# Patient Record
Sex: FEMALE | Race: BLACK OR AFRICAN AMERICAN | NOT HISPANIC OR LATINO | Employment: FULL TIME | URBAN - METROPOLITAN AREA
[De-identification: names, ages, dates, MRNs, and addresses within clinical notes are randomized per-mention and may not be internally consistent; named-entity substitution may affect disease eponyms.]

---

## 2017-04-18 ENCOUNTER — HISTORICAL (OUTPATIENT)
Dept: LAB | Facility: HOSPITAL | Age: 52
End: 2017-04-18

## 2018-02-06 ENCOUNTER — HISTORICAL (OUTPATIENT)
Dept: LAB | Facility: HOSPITAL | Age: 53
End: 2018-02-06

## 2018-02-06 LAB
ABS NEUT (OLG): 2.64 X10(3)/MCL (ref 2.1–9.2)
ALBUMIN SERPL-MCNC: 3.5 GM/DL (ref 3.4–5)
ALBUMIN/GLOB SERPL: 0.6 {RATIO}
ALP SERPL-CCNC: 95 UNIT/L (ref 38–126)
ALT SERPL-CCNC: 41 UNIT/L (ref 12–78)
APPEARANCE, UA: CLEAR
AST SERPL-CCNC: 21 UNIT/L (ref 15–37)
BACTERIA SPEC CULT: ABNORMAL /HPF
BASOPHILS # BLD AUTO: 0 X10(3)/MCL (ref 0–0.2)
BASOPHILS NFR BLD AUTO: 1 %
BILIRUB SERPL-MCNC: 0.2 MG/DL (ref 0.2–1)
BILIRUB UR QL STRIP: NEGATIVE
BILIRUBIN DIRECT+TOT PNL SERPL-MCNC: 0.1 MG/DL (ref 0–0.2)
BILIRUBIN DIRECT+TOT PNL SERPL-MCNC: 0.1 MG/DL (ref 0–0.8)
BUN SERPL-MCNC: 13 MG/DL (ref 7–18)
CALCIUM SERPL-MCNC: 9.1 MG/DL (ref 8.5–10.1)
CHLORIDE SERPL-SCNC: 102 MMOL/L (ref 98–107)
CHOLEST SERPL-MCNC: 180 MG/DL (ref 0–200)
CHOLEST/HDLC SERPL: 2.6 {RATIO} (ref 0–4)
CO2 SERPL-SCNC: 32 MMOL/L (ref 21–32)
COLOR UR: YELLOW
CREAT SERPL-MCNC: 0.69 MG/DL (ref 0.55–1.02)
EOSINOPHIL # BLD AUTO: 0.2 X10(3)/MCL (ref 0–0.9)
EOSINOPHIL NFR BLD AUTO: 4 %
ERYTHROCYTE [DISTWIDTH] IN BLOOD BY AUTOMATED COUNT: 13.8 % (ref 11.5–17)
GLOBULIN SER-MCNC: 5.4 GM/DL (ref 2.4–3.5)
GLUCOSE (UA): NEGATIVE
GLUCOSE SERPL-MCNC: 132 MG/DL (ref 74–106)
HCT VFR BLD AUTO: 40.5 % (ref 37–47)
HDLC SERPL-MCNC: 70 MG/DL (ref 35–60)
HGB BLD-MCNC: 12.2 GM/DL (ref 12–16)
HGB UR QL STRIP: NEGATIVE
IRON SATN MFR SERPL: 20.1 % (ref 20–50)
IRON SERPL-MCNC: 51 MCG/DL (ref 50–175)
KETONES UR QL STRIP: NEGATIVE
LDLC SERPL CALC-MCNC: 94 MG/DL (ref 0–129)
LEUKOCYTE ESTERASE UR QL STRIP: ABNORMAL
LYMPHOCYTES # BLD AUTO: 1.7 X10(3)/MCL (ref 0.6–4.6)
LYMPHOCYTES NFR BLD AUTO: 34 %
MCH RBC QN AUTO: 27.2 PG (ref 27–31)
MCHC RBC AUTO-ENTMCNC: 30.1 GM/DL (ref 33–36)
MCV RBC AUTO: 90.4 FL (ref 80–94)
MONOCYTES # BLD AUTO: 0.3 X10(3)/MCL (ref 0.1–1.3)
MONOCYTES NFR BLD AUTO: 7 %
NEUTROPHILS # BLD AUTO: 2.64 X10(3)/MCL (ref 1.4–7.9)
NEUTROPHILS NFR BLD AUTO: 54 %
NITRITE UR QL STRIP: NEGATIVE
PH UR STRIP: 6.5 [PH] (ref 5–9)
PLATELET # BLD AUTO: 183 X10(3)/MCL (ref 130–400)
PMV BLD AUTO: 10.6 FL (ref 9.4–12.4)
POTASSIUM SERPL-SCNC: 4.1 MMOL/L (ref 3.5–5.1)
PROT SERPL-MCNC: 8.9 GM/DL (ref 6.4–8.2)
PROT UR QL STRIP: NEGATIVE
RBC # BLD AUTO: 4.48 X10(6)/MCL (ref 4.2–5.4)
RBC #/AREA URNS HPF: ABNORMAL /[HPF]
SODIUM SERPL-SCNC: 138 MMOL/L (ref 136–145)
SP GR UR STRIP: 1.02 (ref 1–1.03)
SQUAMOUS EPITHELIAL, UA: ABNORMAL
TIBC SERPL-MCNC: 254 MCG/DL (ref 250–450)
TRANSFERRIN SERPL-MCNC: 208 MG/DL (ref 200–360)
TRIGL SERPL-MCNC: 78 MG/DL (ref 30–150)
TSH SERPL-ACNC: 3.62 MIU/ML (ref 0.36–3.74)
UROBILINOGEN UR STRIP-ACNC: 1
VLDLC SERPL CALC-MCNC: 16 MG/DL
WBC # SPEC AUTO: 4.9 X10(3)/MCL (ref 4.5–11.5)
WBC #/AREA URNS HPF: ABNORMAL /[HPF]

## 2019-07-12 ENCOUNTER — HISTORICAL (OUTPATIENT)
Dept: LAB | Facility: HOSPITAL | Age: 54
End: 2019-07-12

## 2019-07-12 LAB
ABS NEUT (OLG): 1.86 X10(3)/MCL (ref 2.1–9.2)
ALBUMIN SERPL-MCNC: 3.6 GM/DL (ref 3.4–5)
ALBUMIN/GLOB SERPL: 0.8 {RATIO}
ALP SERPL-CCNC: 84 UNIT/L (ref 38–126)
ALT SERPL-CCNC: 22 UNIT/L (ref 12–78)
APPEARANCE, UA: CLEAR
AST SERPL-CCNC: 13 UNIT/L (ref 15–37)
BACTERIA SPEC CULT: NORMAL /HPF
BASOPHILS # BLD AUTO: 0 X10(3)/MCL (ref 0–0.2)
BASOPHILS NFR BLD AUTO: 1 %
BILIRUB SERPL-MCNC: 0.3 MG/DL (ref 0.2–1)
BILIRUB UR QL STRIP: NEGATIVE
BILIRUBIN DIRECT+TOT PNL SERPL-MCNC: 0.1 MG/DL (ref 0–0.2)
BILIRUBIN DIRECT+TOT PNL SERPL-MCNC: 0.2 MG/DL (ref 0–0.8)
BUN SERPL-MCNC: 11 MG/DL (ref 7–18)
CALCIUM SERPL-MCNC: 8.9 MG/DL (ref 8.5–10.1)
CHLORIDE SERPL-SCNC: 103 MMOL/L (ref 98–107)
CHOLEST SERPL-MCNC: 175 MG/DL (ref 0–200)
CHOLEST/HDLC SERPL: 2.9 {RATIO} (ref 0–4)
CO2 SERPL-SCNC: 31 MMOL/L (ref 21–32)
COLOR UR: YELLOW
CREAT SERPL-MCNC: 0.66 MG/DL (ref 0.55–1.02)
EOSINOPHIL # BLD AUTO: 0.3 X10(3)/MCL (ref 0–0.9)
EOSINOPHIL NFR BLD AUTO: 6 %
ERYTHROCYTE [DISTWIDTH] IN BLOOD BY AUTOMATED COUNT: 14 % (ref 11.5–17)
GLOBULIN SER-MCNC: 4.7 GM/DL (ref 2.4–3.5)
GLUCOSE (UA): NEGATIVE
GLUCOSE SERPL-MCNC: 112 MG/DL (ref 74–106)
HCT VFR BLD AUTO: 41.5 % (ref 37–47)
HDLC SERPL-MCNC: 60 MG/DL (ref 35–60)
HGB BLD-MCNC: 12.5 GM/DL (ref 12–16)
HGB UR QL STRIP: NEGATIVE
KETONES UR QL STRIP: NEGATIVE
LDLC SERPL CALC-MCNC: 102 MG/DL (ref 0–129)
LEUKOCYTE ESTERASE UR QL STRIP: NEGATIVE
LYMPHOCYTES # BLD AUTO: 2 X10(3)/MCL (ref 0.6–4.6)
LYMPHOCYTES NFR BLD AUTO: 44 %
MCH RBC QN AUTO: 26.7 PG (ref 27–31)
MCHC RBC AUTO-ENTMCNC: 30.1 GM/DL (ref 33–36)
MCV RBC AUTO: 88.7 FL (ref 80–94)
MONOCYTES # BLD AUTO: 0.4 X10(3)/MCL (ref 0.1–1.3)
MONOCYTES NFR BLD AUTO: 8 %
NEUTROPHILS # BLD AUTO: 1.86 X10(3)/MCL (ref 2.1–9.2)
NEUTROPHILS NFR BLD AUTO: 41 %
NITRITE UR QL STRIP: NEGATIVE
PH UR STRIP: 5.5 [PH] (ref 5–9)
PLATELET # BLD AUTO: 183 X10(3)/MCL (ref 130–400)
PMV BLD AUTO: 11.2 FL (ref 9.4–12.4)
POTASSIUM SERPL-SCNC: 4.1 MMOL/L (ref 3.5–5.1)
PROT SERPL-MCNC: 8.3 GM/DL (ref 6.4–8.2)
PROT UR QL STRIP: NEGATIVE
RBC # BLD AUTO: 4.68 X10(6)/MCL (ref 4.2–5.4)
RBC #/AREA URNS HPF: NORMAL /[HPF]
SODIUM SERPL-SCNC: 138 MMOL/L (ref 136–145)
SP GR UR STRIP: 1.02 (ref 1–1.03)
SQUAMOUS EPITHELIAL, UA: NORMAL
TRIGL SERPL-MCNC: 63 MG/DL (ref 30–150)
TSH SERPL-ACNC: 2.62 MIU/L (ref 0.36–3.74)
UROBILINOGEN UR STRIP-ACNC: 1
VLDLC SERPL CALC-MCNC: 13 MG/DL
WBC # SPEC AUTO: 4.5 X10(3)/MCL (ref 4.5–11.5)
WBC #/AREA URNS HPF: NORMAL /[HPF]

## 2020-02-03 ENCOUNTER — HOSPITAL ENCOUNTER (OUTPATIENT)
Dept: MEDSURG UNIT | Facility: HOSPITAL | Age: 55
End: 2020-02-06
Attending: INTERNAL MEDICINE | Admitting: INTERNAL MEDICINE

## 2020-02-03 LAB
ABS NEUT (OLG): 5.06 X10(3)/MCL (ref 2.1–9.2)
ALBUMIN SERPL-MCNC: 3.3 GM/DL (ref 3.4–5)
ALBUMIN/GLOB SERPL: 0.6 {RATIO}
ALP SERPL-CCNC: 74 UNIT/L (ref 38–126)
ALT SERPL-CCNC: 15 UNIT/L (ref 12–78)
APTT PPP: 21.3 SECOND(S) (ref 24.2–33.9)
AST SERPL-CCNC: 9 UNIT/L (ref 15–37)
BASOPHILS # BLD AUTO: 0 X10(3)/MCL (ref 0–0.2)
BASOPHILS NFR BLD AUTO: 0 %
BILIRUB SERPL-MCNC: 0.5 MG/DL (ref 0.2–1)
BILIRUBIN DIRECT+TOT PNL SERPL-MCNC: 0.1 MG/DL (ref 0–0.2)
BILIRUBIN DIRECT+TOT PNL SERPL-MCNC: 0.4 MG/DL (ref 0–0.8)
BUN SERPL-MCNC: 18 MG/DL (ref 7–18)
CALCIUM SERPL-MCNC: 8.6 MG/DL (ref 8.5–10.1)
CHLORIDE SERPL-SCNC: 102 MMOL/L (ref 98–107)
CO2 SERPL-SCNC: 30 MMOL/L (ref 21–32)
CREAT SERPL-MCNC: 0.71 MG/DL (ref 0.55–1.02)
EOSINOPHIL # BLD AUTO: 0 X10(3)/MCL (ref 0–0.9)
EOSINOPHIL NFR BLD AUTO: 0 %
ERYTHROCYTE [DISTWIDTH] IN BLOOD BY AUTOMATED COUNT: 19.5 % (ref 11.5–17)
GLOBULIN SER-MCNC: 5.2 GM/DL (ref 2.4–3.5)
GLUCOSE SERPL-MCNC: 175 MG/DL (ref 74–106)
HCT VFR BLD AUTO: 24.9 % (ref 37–47)
HGB BLD-MCNC: 7.2 GM/DL (ref 12–16)
INR PPP: 1 (ref 0–1.3)
LYMPHOCYTES # BLD AUTO: 3.2 X10(3)/MCL (ref 0.6–4.6)
LYMPHOCYTES NFR BLD AUTO: 35 %
MCH RBC QN AUTO: 22.6 PG (ref 27–31)
MCHC RBC AUTO-ENTMCNC: 28.9 GM/DL (ref 33–36)
MCV RBC AUTO: 78.3 FL (ref 80–94)
MONOCYTES # BLD AUTO: 0.7 X10(3)/MCL (ref 0.1–1.3)
MONOCYTES NFR BLD AUTO: 8 %
NEUTROPHILS # BLD AUTO: 5.06 X10(3)/MCL (ref 2.1–9.2)
NEUTROPHILS NFR BLD AUTO: 56 %
PLATELET # BLD AUTO: 361 X10(3)/MCL (ref 130–400)
PMV BLD AUTO: 9.1 FL (ref 9.4–12.4)
POTASSIUM SERPL-SCNC: 3.6 MMOL/L (ref 3.5–5.1)
PROT SERPL-MCNC: 8.5 GM/DL (ref 6.4–8.2)
PROTHROMBIN TIME: 13.3 SECOND(S) (ref 12–14)
RBC # BLD AUTO: 3.18 X10(6)/MCL (ref 4.2–5.4)
SODIUM SERPL-SCNC: 136 MMOL/L (ref 136–145)
WBC # SPEC AUTO: 9 X10(3)/MCL (ref 4.5–11.5)

## 2020-02-04 LAB
CROSSMATCH INTERPRETATION: NORMAL
FERRITIN SERPL-MCNC: 16.4 NG/ML (ref 8–388)
FOLATE SERPL-MCNC: 8.6 NG/ML (ref 3.1–17.5)
GROUP & RH: NORMAL
IRON SATN MFR SERPL: 2 % (ref 20–50)
IRON SERPL-MCNC: 7 MCG/DL (ref 50–175)
PRODUCT READY: NORMAL
TIBC SERPL-MCNC: 346 MCG/DL (ref 250–450)
TRANSFERRIN SERPL-MCNC: 249 MG/DL (ref 200–360)
TRANSFUSION ORDER: NORMAL
VIT B12 SERPL-MCNC: 203 PG/ML (ref 193–986)

## 2020-02-05 LAB
ABS NEUT (OLG): 3.03 X10(3)/MCL (ref 2.1–9.2)
BASOPHILS # BLD AUTO: 0 X10(3)/MCL (ref 0–0.2)
BASOPHILS NFR BLD AUTO: 0 %
BUN SERPL-MCNC: 15 MG/DL (ref 7–18)
CALCIUM SERPL-MCNC: 8.5 MG/DL (ref 8.5–10.1)
CHLORIDE SERPL-SCNC: 103 MMOL/L (ref 98–107)
CO2 SERPL-SCNC: 32 MMOL/L (ref 21–32)
COLOR STL: NORMAL
COLOR STL: NORMAL
CONSISTENCY STL: NORMAL
CONSISTENCY STL: NORMAL
CREAT SERPL-MCNC: 0.66 MG/DL (ref 0.55–1.02)
CREAT/UREA NIT SERPL: 22.7
EOSINOPHIL # BLD AUTO: 0.1 X10(3)/MCL (ref 0–0.9)
EOSINOPHIL NFR BLD AUTO: 2 %
ERYTHROCYTE [DISTWIDTH] IN BLOOD BY AUTOMATED COUNT: 19.9 % (ref 11.5–17)
GLUCOSE SERPL-MCNC: 107 MG/DL (ref 74–106)
HCT VFR BLD AUTO: 31.9 % (ref 37–47)
HEMOCCULT SP1 STL QL: NEGATIVE
HEMOCCULT SP2 STL QL: NEGATIVE
HGB BLD-MCNC: 9.1 GM/DL (ref 12–16)
LYMPHOCYTES # BLD AUTO: 2.8 X10(3)/MCL (ref 0.6–4.6)
LYMPHOCYTES NFR BLD AUTO: 42 %
MCH RBC QN AUTO: 23.4 PG (ref 27–31)
MCHC RBC AUTO-ENTMCNC: 28.5 GM/DL (ref 33–36)
MCV RBC AUTO: 82 FL (ref 80–94)
MONOCYTES # BLD AUTO: 0.6 X10(3)/MCL (ref 0.1–1.3)
MONOCYTES NFR BLD AUTO: 8 %
NEUTROPHILS # BLD AUTO: 3.03 X10(3)/MCL (ref 2.1–9.2)
NEUTROPHILS NFR BLD AUTO: 47 %
PLATELET # BLD AUTO: 318 X10(3)/MCL (ref 130–400)
PMV BLD AUTO: 9.5 FL (ref 9.4–12.4)
POTASSIUM SERPL-SCNC: 4.4 MMOL/L (ref 3.5–5.1)
RBC # BLD AUTO: 3.89 X10(6)/MCL (ref 4.2–5.4)
SODIUM SERPL-SCNC: 139 MMOL/L (ref 136–145)
WBC # SPEC AUTO: 6.5 X10(3)/MCL (ref 4.5–11.5)

## 2020-02-06 LAB
ABS NEUT (OLG): 4.2 X10(3)/MCL (ref 2.1–9.2)
BUN SERPL-MCNC: 11 MG/DL (ref 7–18)
CALCIUM SERPL-MCNC: 8.4 MG/DL (ref 8.5–10.1)
CHLORIDE SERPL-SCNC: 104 MMOL/L (ref 98–107)
CO2 SERPL-SCNC: 31 MMOL/L (ref 21–32)
CREAT SERPL-MCNC: 0.69 MG/DL (ref 0.55–1.02)
CREAT/UREA NIT SERPL: 15.9
EOSINOPHIL NFR BLD MANUAL: 2 % (ref 0–8)
ERYTHROCYTE [DISTWIDTH] IN BLOOD BY AUTOMATED COUNT: 20.3 % (ref 11.5–17)
FOLATE SERPL-MCNC: 7.4 NG/ML (ref 3.1–17.5)
GLUCOSE SERPL-MCNC: 110 MG/DL (ref 74–106)
HCT VFR BLD AUTO: 31.9 % (ref 37–47)
HGB BLD-MCNC: 9.2 GM/DL (ref 12–16)
HYPOCHROMIA BLD QL SMEAR: SLIGHT
LDH SERPL-CCNC: 155 UNIT/L (ref 84–246)
LYMPHOCYTES NFR BLD MANUAL: 31 % (ref 13–40)
MCH RBC QN AUTO: 23.5 PG (ref 27–31)
MCHC RBC AUTO-ENTMCNC: 28.8 GM/DL (ref 33–36)
MCV RBC AUTO: 81.4 FL (ref 80–94)
MONOCYTES NFR BLD MANUAL: 4 % (ref 2–11)
NEUTROPHILS NFR BLD MANUAL: 63 % (ref 47–80)
PLATELET # BLD AUTO: 282 X10(3)/MCL (ref 130–400)
PLATELET # BLD EST: NORMAL 10*3/UL
PMV BLD AUTO: 9.4 FL (ref 7.4–10.4)
POTASSIUM SERPL-SCNC: 4.6 MMOL/L (ref 3.5–5.1)
RBC # BLD AUTO: 3.92 X10(6)/MCL (ref 4.2–5.4)
RET# (OHS): 0.1 X10^6/ML (ref 0.02–0.08)
RETICULOCYTE COUNT AUTOMATED (OLG): 2.4 % (ref 1.1–2.1)
SODIUM SERPL-SCNC: 139 MMOL/L (ref 136–145)
T4 FREE SERPL-MCNC: 1.19 NG/DL (ref 0.76–1.46)
TSH SERPL-ACNC: 1.35 MIU/L (ref 0.36–3.74)
VIT B12 SERPL-MCNC: 252 PG/ML (ref 193–986)
WBC # SPEC AUTO: 7 X10(3)/MCL (ref 4.5–11.5)

## 2021-09-09 ENCOUNTER — HISTORICAL (OUTPATIENT)
Dept: ADMINISTRATIVE | Facility: HOSPITAL | Age: 56
End: 2021-09-09

## 2021-09-09 LAB
ABS NEUT (OLG): 1.92 X10(3)/MCL (ref 2.1–9.2)
ALBUMIN SERPL-MCNC: 3.8 GM/DL (ref 3.5–5)
ALBUMIN/GLOB SERPL: 0.9 RATIO (ref 1.1–2)
ALP SERPL-CCNC: 80 UNIT/L (ref 40–150)
ALT SERPL-CCNC: 24 UNIT/L (ref 0–55)
ANTINUCLEAR ANTIBODY SCREEN (OHS): POSITIVE
AST SERPL-CCNC: 31 UNIT/L (ref 5–34)
BASOPHILS # BLD AUTO: 0 X10(3)/MCL (ref 0–0.2)
BASOPHILS NFR BLD AUTO: 0 %
BILIRUB SERPL-MCNC: 0.3 MG/DL
BILIRUBIN DIRECT+TOT PNL SERPL-MCNC: 0.1 MG/DL (ref 0–0.5)
BILIRUBIN DIRECT+TOT PNL SERPL-MCNC: 0.2 MG/DL (ref 0–0.8)
BUN SERPL-MCNC: 17.9 MG/DL (ref 9.8–20.1)
CALCIUM SERPL-MCNC: 9.5 MG/DL (ref 8.4–10.2)
CENTROMERE PROTEIN ANTIBODY (OHS): NEGATIVE
CHLORIDE SERPL-SCNC: 103 MMOL/L (ref 98–107)
CO2 SERPL-SCNC: 28 MMOL/L (ref 22–29)
CREAT SERPL-MCNC: 0.7 MG/DL (ref 0.55–1.02)
CRP SERPL HS-MCNC: 0.41 MG/DL
DEPRECATED CALCIDIOL+CALCIFEROL SERPL-MC: 18.1 NG/ML (ref 30–80)
DSDNA ANTIBODY (OHS): NEGATIVE
EOSINOPHIL # BLD AUTO: 0.2 X10(3)/MCL (ref 0–0.9)
EOSINOPHIL NFR BLD AUTO: 4 %
ERYTHROCYTE [DISTWIDTH] IN BLOOD BY AUTOMATED COUNT: 13.7 % (ref 11.5–17)
ERYTHROCYTE [SEDIMENTATION RATE] IN BLOOD: 26 MM/HR (ref 0–20)
GLOBULIN SER-MCNC: 4.3 GM/DL (ref 2.4–3.5)
GLUCOSE SERPL-MCNC: 92 MG/DL (ref 74–100)
HCT VFR BLD AUTO: 40.1 % (ref 37–47)
HGB BLD-MCNC: 12.4 GM/DL (ref 12–16)
JO-1 ANTIBODY (OHS): NEGATIVE
LYMPHOCYTES # BLD AUTO: 1.5 X10(3)/MCL (ref 0.6–4.6)
LYMPHOCYTES NFR BLD AUTO: 38 %
MCH RBC QN AUTO: 27.2 PG (ref 27–31)
MCHC RBC AUTO-ENTMCNC: 30.9 GM/DL (ref 33–36)
MCV RBC AUTO: 87.9 FL (ref 80–94)
MONOCYTES # BLD AUTO: 0.3 X10(3)/MCL (ref 0.1–1.3)
MONOCYTES NFR BLD AUTO: 8 %
NEUTROPHILS # BLD AUTO: 1.92 X10(3)/MCL (ref 2.1–9.2)
NEUTROPHILS NFR BLD AUTO: 48 %
PLATELET # BLD AUTO: 218 X10(3)/MCL (ref 130–400)
PMV BLD AUTO: 11.4 FL (ref 9.4–12.4)
POTASSIUM SERPL-SCNC: 4.5 MMOL/L (ref 3.5–5.1)
PROT SERPL-MCNC: 8.1 GM/DL (ref 6.4–8.3)
RBC # BLD AUTO: 4.56 X10(6)/MCL (ref 4.2–5.4)
RNP70 ANTIBODY (OHS): NEGATIVE
SCLERODERMA (SCL-70S) ANTIBODY (OHS): NEGATIVE
SMITH DP IGG (OHS): NEGATIVE
SODIUM SERPL-SCNC: 140 MMOL/L (ref 136–145)
SSA(RO) ANTIBODY (OHS): POSITIVE
SSB(LA) ANTIBODY (OHS): NEGATIVE
TSH SERPL-ACNC: 2.24 UIU/ML (ref 0.35–4.94)
U1RNP ANTIBODY (OHS): NEGATIVE
WBC # SPEC AUTO: 4 X10(3)/MCL (ref 4.5–11.5)

## 2021-09-22 ENCOUNTER — HISTORICAL (OUTPATIENT)
Dept: ADMINISTRATIVE | Facility: HOSPITAL | Age: 56
End: 2021-09-22

## 2022-04-10 ENCOUNTER — HISTORICAL (OUTPATIENT)
Dept: ADMINISTRATIVE | Facility: HOSPITAL | Age: 57
End: 2022-04-10

## 2022-04-24 VITALS
HEIGHT: 63 IN | DIASTOLIC BLOOD PRESSURE: 79 MMHG | WEIGHT: 235.44 LBS | SYSTOLIC BLOOD PRESSURE: 136 MMHG | BODY MASS INDEX: 41.71 KG/M2 | OXYGEN SATURATION: 98 %

## 2022-04-29 NOTE — ED PROVIDER NOTES
Patient:   Virgie Light             MRN: [id number]            FIN: [id number]               Age:   54 years     Sex:  Female     :  1965   Associated Diagnoses:   None   Author:   Arsenio Flanagan MD      Addendum      Teaching-Supervisory Addendum-Brief   I participated in the following activities of this patients care: the medical history, the physical exam, medical decision making.   I personally performed: supervision of the patient's care, the medical history, the physical exam, the medical decision making.   The case was discussed with: the nurse practitioner.   Evaluation and management service: I agree with the evaluation and management decisions made in this patient's care.   Results interpretation: I agree with the study interpretation in this patient's care.   Vital signs: Basic Oxygen Information   2020 1:11 CST        SpO2                      100 %                             Oxygen Therapy            Room air    2/3/2020 23:30 CST       SpO2                      100 %                             Oxygen Therapy            Room air  .   Notes: Pt seen and examined.  She presents with generalized weakness, reminiscent of her prior episodes of anemia.  H&H  today.  She has h/o mulitple transfusions in the past as well as iron infusions.  States h/o menorrhagia causing anemia but has had no vaginal bleeding since D/C from [facility] 2-3 weeks ago.  She is on Plavix, and has h/o mutiple episodes of DVT/PE, so likely needs long-term anticoagulation, which certainly complicates matters.  Denies melena or BRBPR.  Will admit for transfusion..

## 2022-04-29 NOTE — ED PROVIDER NOTES
Patient:   Virgie Light             MRN: [id number]            FIN: [id number]               Age:   54 years     Sex:  Female     :  1965   Associated Diagnoses:   Symptomatic anemia   Author:   Ernst Matamoros      Basic Information   Time seen: Date & time 2020 01:10:00.   History source: Patient.   Arrival mode: Private vehicle.   History limitation: None.   Additional information: Chief Complaint from Nursing Triage Note : Chief Complaint   2/3/2020 23:30 CST       Chief Complaint           SENT FROM PCP FOR H&H OF 7.7/26.2. REPORTS THAT SHE HAD VAG BLEEDING 2 WEEKS AGO BUT NOT CURRENTLY. PCP ME'ED ELIQUIS TODAY. REPORTS FATIGUE  .      History of Present Illness   The patient presents with abnormal lab test.  The onset was intermittent x 3-4 weeks .  Lab test value Lab test was performed by: primary care physician Patient was notified of lab results by: doctor's office.  Associated symptoms: none.  Risk factors consist of no history of gastrointestinal bleeding and not dialysis patient.  Prior episodes: occasional.  Therapy today: see nurses notes.  Additional history: none.        Review of Systems   Constitutional symptoms:  No fever, no chills.    Respiratory symptoms:  No shortness of breath, no cough.    Cardiovascular symptoms:  No chest pain, no palpitations.    Gastrointestinal symptoms:  No vomiting, no diarrhea.    Genitourinary symptoms:  Vaginal bleeding.   Musculoskeletal symptoms:  No back pain,    Neurologic symptoms:  No altered level of consciousness, no weakness.              Additional review of systems information: All other systems reviewed and otherwise negative.      Health Status   Allergies:    Allergic Reactions (Selected)  Severity Not Documented  Iodine- Rash.  Morphine- No reactions were documented.  Shrimp- Rash.  Theophylline- Shakes..   Medications:  (Selected)   Prescriptions  Prescribed  Lasix 20 mg oral tablet: 20 mg = 1 tab(s), Oral, BID, # 180  tab(s), 3 Refill(s), Pharmacy: [facility]  Lexapro 20 mg oral tablet: 20 mg = 1 tab(s), Oral, Daily, # 30 tab(s), 2 Refill(s), Pharmacy: [facility]  Plavix 75 mg oral tablet: 75 mg = 1 tab(s), Oral, Daily, # 30 tab(s), 2 Refill(s), Pharmacy: [facility]  gabapentin 100 mg oral capsule: 100 mg = 1 cap(s), Oral, BID, # 60 cap(s), 0 Refill(s), Pharmacy: [facility]  traZODONE 50 mg oral tablet ( Desyrel ): 25 mg = 0.5 tab(s), Oral, Once a day (at bedtime), # 30 tab(s), 1 Refill(s), Pharmacy: [facility]  Documented Medications  Documented  aspirin 81 mg oral Delayed Release (EC) tablet: 81 mg = 1 tab(s), Oral, Daily, # 30 tab(s), 0 Refill(s), per nurse's notes.   Immunizations: Per nurse's notes.   Menstrual history: Per nurse's notes.      Past Medical/ Family/ Social History   Medical history:    Resolved  Fatigue (836749255):  Resolved.  Screening cholesterol level (370802789):  Resolved.  Need for influenza vaccination (249450991):  Resolved.  Pulmonary embolism (72897432):  Resolved.  Well adult exam (664153505):  Resolved.  Weight gain (74151412):  Resolved.  Acute bronchitis (25535821):  Resolved.  Encounter for weight loss counseling (545056558):  Resolved., Reviewed as documented in chart.   Surgical history:    Primary repair of inguinal hernia (372219670) on 2014 at 49 Years.  Excision fibroid (510566784) on 2014 at 49 Years.  Incision of ovary (50383110) on 2014 at 49 Years.  Comments:  3/20/2015 8:36 OWEN Jean-Baptiste RN, Vane  Pt states Left Ovary has been removed  H/O umbilical hernia repair ([id number]) in  at 49 Years.   section (21776135) in  at 25 Years.   section (21284576) in  at 24 Years.   section (71663404) in  at 23 Years.  TL - Tubal ligation (219246028).  H/O umbilical hernia repair ([id number]).  left ovary removed.  uterine fibroid  removal., Reviewed as documented in chart.   Family history:    Diabetes mellitus type 2  Father  Primary malignant neoplasm of prostate  Father (Isis Light)  Father  Diabetes mellitus  Mother (Virgie Light, )  Father (Isis Light)  Brother (Jose Francisco Colindres)  End stage renal failure on dialysis  Brother (Jose Francisco Colindres)  Heart disease  Father (Isis Light)  Hypertension.  Mother (Virgie Light, )  Father (Isis Light)  Brother (Jose Francisco Colindres)  Father  COPD (chronic obstructive pulmonary disease).  Mother (Virgie Light, )  Pancreatic cancer  Mother (Virgie Light, )  , Reviewed as documented in chart.   Social history:    Social & Psychosocial Habits    Alcohol  2016  Use: Never    2015 Risk Assessment: Denies Alcohol Use    Employment/School  2016  Status: Employed    Description: Manager for [employer]    2015  Status: Employed    Activity level: Occasional physical work    Highest education: High school    Work hazards: Shift/Night work    Exercise    Comment: no current program - 2016 15:13 - Nafisa Moore LPN    2015  Self assessment: Fair condition    Exercise type: Walking    Comment: Pt states walks a lot when at work. - 2015 08:43 - Vane Jean-Baptiste RN    Home/Environment  2016  Lives with: Significant other    Living situation: Home/Independent    2015  Lives with: Alone    Living situation: Home/Independent    Alcohol abuse in household: No    Substance abuse in household: No    Smoker in household: No    Injuries/Abuse/Neglect in household: No    Feels unsafe at home: No    Safe place to go: Yes    Family/Friends available to help: Yes    Concern for family members at home: No    Major illness in household: No    Financial concerns: Yes    Nutrition/Health  2016  Type of diet: Regular    2015  Type of diet: Regular    Wants to lose weight: Yes    Sleeping concerns:  "No    Feels highly stressed: Yes    Comment: Pt states "I eat a lot of vegetables." - 03/20/2015 08:42 - Estiven BARLOW, Vane    Substance Use  07/14/2016  Use: Never    03/18/2015 Risk Assessment: Denies Substance Abuse    Tobacco  03/18/2015 Risk Assessment: Denies Tobacco Use    12/09/2019  Use: Never (less than 100 in l    Patient Wants Consult For Cessation Counseling N/A    02/03/2020  Use: Never (less than 100 in l    Patient Wants Consult For Cessation Counseling N/A    Abuse/Neglect  12/09/2019  SHX Any signs of abuse or neglect No    02/03/2020  SHX Any signs of abuse or neglect No  , Reviewed as documented in chart.   Problem list: Per nurse's notes.      Physical Examination               Vital Signs   Vital Signs   2/4/2020 1:11 CST        Peripheral Pulse Rate     88 bpm                             SpO2                      100 %                             Oxygen Therapy            Room air                             Systolic Blood Pressure   153 mmHg  HI                             Diastolic Blood Pressure  54 mmHg  LOW                             Mean Arterial Pressure, Cuff              87 mmHg    2/3/2020 23:30 CST       Temperature Oral          36.8 DegC                             Temperature Oral (calculated)             98.24 DegF                             Peripheral Pulse Rate     89 bpm                             Respiratory Rate          14 br/min                             SpO2                      100 %                             Oxygen Therapy            Room air                             Systolic Blood Pressure   174 mmHg  HI                             Diastolic Blood Pressure  75 mmHg  .   Measurements   2/3/2020 23:30 CST       Weight Dosing             113.5 kg                             Weight Measured and Calculated in Lbs     250.22 lb                             Weight Estimated          113.5 kg                             Height/Length Dosing      157 cm              "                Height/Length Estimated   157 cm                             Body Mass Index Estimated 46.05 kg/m2  .   General:  Alert, no acute distress, well hydrated, Skin: Normal for ethnicity.    Skin:  Warm, dry, pink, intact.    Head:  Normocephalic.   Neck:  Supple, no tenderness, full range of motion.    Eye:  Pupils are equal, round and reactive to light, extraocular movements are intact, normal conjunctiva.    Ears, nose, mouth and throat:  Oral mucosa moist.   Cardiovascular:  Regular rate and rhythm, No murmur, Normal peripheral perfusion.    Respiratory:  Lungs are clear to auscultation, breath sounds are equal, Respirations: not tachypneic, not labored, not shallow, Retractions: None.    Chest wall:  No tenderness.   Back:  Normal range of motion, Normal alignment, No costovertebral angle tenderness,    Musculoskeletal:  Normal ROM, normal strength, no swelling, no deformity.    Gastrointestinal:  Soft, Nontender, Non distended, Normal bowel sounds.    Neurological:  Alert and oriented to person, place, time, and situation, No focal neurological deficit observed, normal sensory observed, normal motor observed, normal speech observed, normal coordination observed, Gait: Normal.    Psychiatric:  Cooperative, appropriate mood & affect, normal judgment.       Medical Decision Making   Documents reviewed:  Emergency department nurses' notes.   Results review:  Lab results : Lab View   2/3/2020 23:47 CST       Sodium Lvl                136 mmol/L                             Potassium Lvl             3.6 mmol/L                             Chloride                  102 mmol/L                             CO2                       30.0 mmol/L                             Calcium Lvl               8.6 mg/dL                             Glucose Lvl               175 mg/dL  HI                             BUN                       18.0 mg/dL                             Creatinine                0.71 mg/dL                              eGFR-AA                   >60 mL/min/1.73 m2  NA                             eGFR-KARMA                  >60 mL/min/1.73 m2  NA                             Bili Total                0.5 mg/dL                             Bili Direct               0.10 mg/dL                             Bili Indirect             0.40 mg/dL                             AST                       9 unit/L  LOW                             ALT                       15 unit/L                             Alk Phos                  74 unit/L                             Total Protein             8.5 gm/dL  HI                             Albumin Lvl               3.30 gm/dL  LOW                             Globulin                  5.20 gm/dL  HI                             A/G Ratio                 0.6  NA                             PT                        13.3 second(s)                             INR                       1.0                             PTT                       21.3 second(s)  LOW                             WBC                       9.0 x10(3)/mcL                             RBC                       3.18 x10(6)/mcL  LOW                             Hgb                       7.2 gm/dL  LOW                             Hct                       24.9 %  LOW                             Platelet                  361 x10(3)/mcL                             MCV                       78.3 fL  LOW                             MCH                       22.6 pg  LOW                             MCHC                      28.9 gm/dL  LOW                             RDW                       19.5 %  HI                             MPV                       9.1 fL  LOW                             Abs Neut                  5.06 x10(3)/mcL                             Neutro Auto               56 %  NA                             Lymph Auto                35 %  NA                             Mono Auto                 8 %  NA                              Eos Auto                  0 %  NA                             Abs Eos                   0.0 x10(3)/mcL                             Basophil Auto             0 %  NA                             Abs Neutro                5.06 x10(3)/mcL                             Abs Lymph                 3.2 x10(3)/mcL                             Abs Mono                  0.7 x10(3)/mcL                             Abs Baso                  0.0 x10(3)/mcL  .      Impression and Plan   Diagnosis   Symptomatic anemia (APU15-LN D64.9)      Calls-Consults   -  2/4/2020 01:21:00 , Brent Deras MD, [facility] accepted patient .    Plan   Condition: Stable.    Disposition: Admit time  2/4/2020 02:08:00, Place in Observation Unit.    Counseled: Patient, Regarding diagnosis, Regarding diagnostic results, Regarding treatment plan, Patient indicated understanding of instructions.    Orders: Launch Orders   Admit/Transfer/Discharge:  Place in Outpatient Observation (Order): 2/4/2020 2:08 CST, Medical Unit Brent Deras MD, No.    Notes: Admitted in collaboration with Dr. Flanagan .

## 2022-04-29 NOTE — DISCHARGE SUMMARY
Patient:   Virgie Light             MRN: [id number]            FIN: [id number]               Age:   54 years     Sex:  Female     :  1965   Associated Diagnoses:   Symptomatic anemia   Author:   Michael Gardiner MD      Discharge Information      Discharge Summary Information   Admitted  2/3/2020   Discharged  2020   Discharge diagnosis     Symptomatic anemia (DBX91-VO D64.9).     Discharge medications     OTHER MEDICATIONS (Selected)   Prescriptions  Prescribed  Colace 100 mg oral capsule: 100 mg = 1 cap(s), Oral, BID, # 60 cap(s), 0 Refill(s), Pharmacy: [facility], 157, cm, Height/Length Dosing, 20 4:10:00 CST, 113.5, kg, Weight Dosing, 20 4:10:00 CST  Lasix 20 mg oral tablet: 20 mg = 1 tab(s), Oral, BID, # 180 tab(s), 3 Refill(s), Pharmacy: [facility]  Vitamin C 100 mg oral tablet, chewable: 100 mg = 1 tab(s), Chewed, Daily, # 90 tab(s), 2 Refill(s), Pharmacy: [facility], 157, cm, Height/Length Dosing, 20 4:10:00 CST, 113.5, kg, Weight Dosing, 20 4:10:00 CST  ferrous sulfate 325 mg (65 mg elemental iron) oral enteric coated tablet: 325 mg = 1 tab(s), Oral, BID, # 60 tab(s), 0 Refill(s), Pharmacy: [facility], 157, cm, Height/Length Dosing, 20 4:10:00 CST, 113.5, kg, Weight Dosing, 20 4:10:00 CST  Documented Medications  Documented  aspirin 81 mg oral Delayed Release (EC) tablet: 81 mg = 1 tab(s), Oral, Daily, # 30 tab(s), 0 Refill(s).        Physical Examination   Vital Signs   2020 14:25 CST       Temperature Oral          36.8 DegC                             Temperature Oral (calculated)             98.24 DegF                             Peripheral Pulse Rate     87 bpm                             Heart Rate Monitored      84                             Respiratory Rate          18 br/min                             SpO2                      100 %                             Oxygen Therapy            Room air                              Systolic Blood Pressure   117 mmHg                             Diastolic Blood Pressure  65 mmHg                             Mean Arterial Pressure, Cuff              82 mmHg     General : AAOX3, No acute distress.  HEENT: AT/NC, PERRLA +Ve, EOMI  Resp : CTA B, No W/C/R.  Cardio : RRR, No M/G/R  GI : NT,ND, No oraganomegaly, BS +Ve   : No CVA tenderness  MSK : No joint deformity, No peripheral edema  Skin : No significant abnormal findings  CNS : No FND      Hospital Course       Mrs. Light is a 54-year-old female with a significant history of VTE is on Plavix who was referred here by her PCP for evaluation of anemia. Patient reports about 2 weeks ago she was on her menstrual cycle with heavy bleeding. She followed up with her PCP for generalized weakness similar to when she has had prior episodes anemia. She has had multiple transfusions in the past as well as iron infusions. Last transfusion was January 12. She states that she has a history of menorrhagia causing her anemia. She denies any other symptoms; no hematochezia or melena, chest pain or shortness of breath. Labs significant for microcytic anemia H&H 7.2/24.9. She will get 2 units of packed red blood cells admitted to the hospital service for symptomatic anemia. Status post 2 units of PRBCs with appropriate increase in H&H. She reported she was not bleeding since discharge from [facility] 2 months ago and she is not taking oral anticoagulation secondary to affordability. Apparently she was never worked up for anemia.  Anemia work-up showed  Serum iron of 7, transferrin 249, TIBC of 346, iron saturation 2.0, ferritin 16.4.  Normal folate and vitamin B12 levels, normal TSH levels.  Stool occult test was negative for blood.   Feraheme 1020 mg IV x1 during the hospital course. Educated patient to stay compliant with oral iron therapy after discharge educated patient to take iron before food to enhance absorption.   Counseled patient to take alternative days if she cannot tolerate daily iron therapy.  Patient has verbalized understanding and agreed to follow recommendations. Patient was also encouraged to lose weight with dietary modifications and physical exercise.  Aspirin was continued at discharge patient will follow with her cardiology before resuming Plavix.  She needs follow-up with OB/GYN for possible removal of 9.8 cm uterine fibroid.    Symptomatic anemia  Iron deficiency anemia  Hx of menorrhagia - not bleeding anymore  History of multiple DVTs - was on OAC - held due to affordability  On chronic dual antiplatelet therapy  Uterine fibroids  Asthma    Time Spent 32 minutes.      Discharge Plan   Discharge Summary Plan   Discharge Status: improved.     Discharge instructions given: to patient.     Discharge disposition: discharge to home self care.     Education and Follow-up   Discharge Planning: Anemia, Iron-Rich Diet, Khurram Blake 2/14/2020 09:00:00.

## 2022-05-03 NOTE — HISTORICAL [facility]
This is a historical note converted from [employer]. Formatting and pictures may have been removed.  Please reference Rachel for original formatting and attached multimedia. Chief Complaint  Abnormal lab values  History of Present Illness  PCP: Khurram Blake MD  CODE STATUS: Full  ?  Mrs. Light is a 54-year-old female with a significant history of VTE is on Plavix who was referred here by her PCP for evaluation of anemia.? Patient reports about 2 weeks ago she was on her menstrual cycle with heavy bleeding.? She followed up with her PCP for generalized weakness similar to when she has had prior episodes anemia.? She has had multiple transfusions in the past as well as iron infusions.? Last transfusion was January 12. ?She states that she has a history of menorrhagia causing her anemia.? She denies any other symptoms; no hematochezia or melena, chest pain or shortness of breath.? Labs significant for microcytic anemia H&H 7.2/24.9.? She?will get ?2 units of packed red blood cells admitted to the hospital service for symptomatic anemia.  Review of Systems  Except as documented all systems reviewed and negative  Physical Exam  Vitals & Measurements  T:?36.5? ?C (Oral)? TMIN:?36.3? ?C (Oral)? TMAX:?36.8? ?C (Oral)? HR:?84(Monitored)? RR:?18? BP:?135/79? SpO2:?100%? WT:?113.5?kg? BMI:?45.84?  General:?NAD, nontoxic appearing  Eye: PERRLA, clear conjunctiva  HENT:?moist mucus membranes, normocephalic  Neck: full range of motion, no lymphadenopathy  Respiratory:?clear to auscultation bilaterally, no wheezes rales or rhonchi  Cardiovascular:?regular rate and rhythm without murmurs, gallops or rubs  Gastrointestinal:?soft, non-tender, non-distended, active bowel sounds  Genitourinary: no CVA tenderness to palpation  Musculoskeletal:?full range of motion of all extremities  Integumentary: no rashes or skin lesions present  Neurologic: cranial nerves intact, no signs of peripheral neurological deficit, motor/sensory  function intact  ?   Medications (5) Active  Scheduled: (1)  ferrous sulfate 325 mg Tab UD ?325 mg 1 tab(s), Oral, BID  Continuous: (0)  PRN: (4)  acetaminophen 500 mg Tab ?1,000 mg 2 tab(s), Oral, q6hr  acetaminophen 650 mg/20.3mL Liqu Adult UD ?650 mg 20.3 mL, Oral, q6hr  albuterol 0.083% Sol 3 mL UD ?2.5 mg 3 mL, NEB, q4hr  ondansetron 2 mg/mL inj - 2mL ?4 mg 2 mL, IV Push, q4hr  Assessment/Plan  Symptomatic anemia  Generalized weakness  History of?iron deficiency anemia?s/t menorrhagia  History of VTE on Plavix  Asthma  Menorrhagia  Abnormal uterine bleeding  Uterine fibroids  ?  Plan:  ?  Does not currently have any vaginal bleeding  Patient is on Plavix for VTE?prophylaxis,?she cannot afford?DOACs  We will hold ASA and?Plavix for now  Given 2 units PRBCs, monitor for appropriate response  Iron panel noted  Start?ferrous sulfate?325 twice daily  Pelvic ultrasound 2017 reviewed, showed 9.8 cm fibroid  She is followed by GYN Dr. Navas, will need follow-up?upon discharge  Would likely benefit from hysterectomy?or uterine ablation  Needs long-term anticoagulation due to her hypercoagulable state  Possible discharge in a.m. with clinical improvement  DVT prophylaxis:?SCDs  ?  ?  Critical Care Diagnosis:?menorrhagia/Symptomatic anemia requiring blood transfusion  Critical care interventions: hands on evaluation, review of labs/radiographs/records and discussions with patient.?  Critical care time spent = 41 mins  ?  I, Rupert Forrest PA-C, have seen and discussed this patients case with Dr.?Castro LING  Please see addendum section and the rest of the note for further information on patient assessment and treatment  dr cee- For this encounter, i have reviewed the NP/PA documentation,treatment plan, and medical decision making and had face to face time with the patient, as evidence of documentation in one of the following areas: CC/HPI/PE/MDM.  menorrhagia with symptomatic anemia requiring prbc  transfusion  vss  cta  s1s2  abd- unremarkable  agree with transfusion  ?   Problem List/Past Medical History  Ongoing  Anemia  Asthma  Depression, major  DVT (deep venous thrombosis)  Eczema  Generalized anxiety disorder  Hypercoagulable state  Lower extremity edema  Numbness of right lower extremity  Obesity  PE (pulmonary embolism)  Right shoulder pain  Tinea corporis  Urinary frequency  Historical  Acute bronchitis  Encounter for weight loss counseling  Fatigue  Need for influenza vaccination  Pulmonary embolism  Screening cholesterol level  Weight gain  Well adult exam  Procedure/Surgical History  Excision fibroid (2014)  Incision of ovary (2014)  Primary repair of inguinal hernia (2014)  H/O umbilical hernia repair ()   section ()   section ()   section ()  H/O umbilical hernia repair  left ovary removed  TL - Tubal ligation  uterine fibroid removal   Medications  Inpatient  acetaminophen, 650 mg= 20.3 mL, Oral, q6hr, PRN  acetaminophen, 1000 mg= 2 tab(s), Oral, q6hr, PRN  albuterol, 2.5 mg= 3 mL, NEB, q4hr, PRN  Zofran, 4 mg= 2 mL, IV Push, q4hr, PRN  Home  aspirin 81 mg oral Delayed Release (EC) tablet, 81 mg= 1 tab(s), Oral, Daily  gabapentin 100 mg oral capsule, 100 mg= 1 cap(s), Oral, BID,? ?Not taking: PER PATIENT  Lasix 20 mg oral tablet, 20 mg= 1 tab(s), Oral, BID, 3 refills  Lexapro 20 mg oral tablet, 20 mg= 1 tab(s), Oral, Daily, 2 refills,? ?Still taking, not as prescribed: PER PATIENT  methylPREDNISolone 4 mg oral .tab, 1 packet(s), Oral, Once  Plavix 75 mg oral tablet, 75 mg= 1 tab(s), Oral, Daily, 2 refills  traZODONE 50 mg oral tablet ( Desyrel ), 25 mg= 0.5 tab(s), Oral, Once a day (at bedtime), 1 refills,? 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 Highest education level: High school. Workplace hazards: Shift/Night work., 03/20/2015  Exercise  Self assessment: Fair condition. Exercise type: Walking., 03/20/2015  Home/Environment  Lives with Significant other. Living situation: Home/Independent., 07/14/2016  Lives with Alone. Living situation: Home/Independent. Alcohol abuse in household: No. Substance abuse in household: No. Smoker in household: No. Injuries/Abuse/Neglect in household: No. Feels unsafe at home: No. Safe place to go: Yes. Family/Friends available for support: Yes. Concern for family members at home: No. Major illness in household: No. Financial concerns: Yes., 03/20/2015  Nutrition/Health  Regular, 07/14/2016  Regular, Wants to lose weight: Yes. Sleeping concerns: No. Feels highly stressed: Yes., 03/20/2015  Substance Use - Denies Substance Abuse, 03/18/2015  Never, 07/14/2016  Tobacco - Denies Tobacco Use, 03/18/2015  Never (less than 100 in lifetime), No, 02/04/2020  Never (less than 100 in lifetime), N/A, 02/03/2020  Never (less than 100 in lifetime), N/A, 12/09/2019  Family History  COPD (chronic obstructive pulmonary disease).: Mother.  Diabetes mellitus: Mother, Father and Brother.  Diabetes mellitus type 2: Father.  End stage renal failure on dialysis: Brother.  Heart disease: Father.  Hypertension.: Mother, Father, Father and Brother.  Pancreatic cancer: Mother.  Primary malignant neoplasm of prostate: Father and Father.  Immunizations  Vaccine Date Status Comments   influenza virus vaccine, inactivated 11/14/2018 Given pt tolerated well   tetanus/diphtheria/pertussis, acel(Tdap) 02/06/2018 Given    influenza virus vaccine, inactivated 02/06/2018 Given    influenza virus vaccine, inactivated 11/11/2016 Given    influenza virus vaccine, inactivated 10/13/2016 Given

## 2022-11-01 ENCOUNTER — OFFICE VISIT (OUTPATIENT)
Dept: RHEUMATOLOGY | Facility: CLINIC | Age: 57
End: 2022-11-01
Payer: MEDICAID

## 2022-11-01 VITALS
DIASTOLIC BLOOD PRESSURE: 85 MMHG | OXYGEN SATURATION: 98 % | SYSTOLIC BLOOD PRESSURE: 135 MMHG | HEART RATE: 74 BPM | RESPIRATION RATE: 16 BRPM | WEIGHT: 238.38 LBS | BODY MASS INDEX: 42.24 KG/M2 | HEIGHT: 63 IN | TEMPERATURE: 98 F

## 2022-11-01 DIAGNOSIS — M35.9 UNDIFFERENTIATED CONNECTIVE TISSUE DISEASE: Primary | ICD-10-CM

## 2022-11-01 DIAGNOSIS — M79.7 FIBROMYALGIA: ICD-10-CM

## 2022-11-01 DIAGNOSIS — E55.9 VITAMIN D DEFICIENCY: ICD-10-CM

## 2022-11-01 PROBLEM — F33.1 MODERATE EPISODE OF RECURRENT MAJOR DEPRESSIVE DISORDER: Status: ACTIVE | Noted: 2022-11-01

## 2022-11-01 PROCEDURE — 99999 PR PBB SHADOW E&M-EST. PATIENT-LVL III: ICD-10-PCS | Mod: PBBFAC,,, | Performed by: INTERNAL MEDICINE

## 2022-11-01 PROCEDURE — 4010F PR ACE/ARB THEARPY RXD/TAKEN: ICD-10-PCS | Mod: CPTII,,, | Performed by: INTERNAL MEDICINE

## 2022-11-01 PROCEDURE — 1159F MED LIST DOCD IN RCRD: CPT | Mod: CPTII,,, | Performed by: INTERNAL MEDICINE

## 2022-11-01 PROCEDURE — 99213 OFFICE O/P EST LOW 20 MIN: CPT | Mod: S$PBB,,, | Performed by: INTERNAL MEDICINE

## 2022-11-01 PROCEDURE — 99999 PR PBB SHADOW E&M-EST. PATIENT-LVL III: CPT | Mod: PBBFAC,,, | Performed by: INTERNAL MEDICINE

## 2022-11-01 PROCEDURE — 3075F SYST BP GE 130 - 139MM HG: CPT | Mod: CPTII,,, | Performed by: INTERNAL MEDICINE

## 2022-11-01 PROCEDURE — 3079F DIAST BP 80-89 MM HG: CPT | Mod: CPTII,,, | Performed by: INTERNAL MEDICINE

## 2022-11-01 PROCEDURE — 3075F PR MOST RECENT SYSTOLIC BLOOD PRESS GE 130-139MM HG: ICD-10-PCS | Mod: CPTII,,, | Performed by: INTERNAL MEDICINE

## 2022-11-01 PROCEDURE — 1159F PR MEDICATION LIST DOCUMENTED IN MEDICAL RECORD: ICD-10-PCS | Mod: CPTII,,, | Performed by: INTERNAL MEDICINE

## 2022-11-01 PROCEDURE — 99213 PR OFFICE/OUTPT VISIT, EST, LEVL III, 20-29 MIN: ICD-10-PCS | Mod: S$PBB,,, | Performed by: INTERNAL MEDICINE

## 2022-11-01 PROCEDURE — 4010F ACE/ARB THERAPY RXD/TAKEN: CPT | Mod: CPTII,,, | Performed by: INTERNAL MEDICINE

## 2022-11-01 PROCEDURE — 3079F PR MOST RECENT DIASTOLIC BLOOD PRESSURE 80-89 MM HG: ICD-10-PCS | Mod: CPTII,,, | Performed by: INTERNAL MEDICINE

## 2022-11-01 PROCEDURE — 99213 OFFICE O/P EST LOW 20 MIN: CPT | Mod: PBBFAC | Performed by: INTERNAL MEDICINE

## 2022-11-01 RX ORDER — CLONIDINE HYDROCHLORIDE 0.1 MG/1
0.1 TABLET ORAL ONCE
COMMUNITY
End: 2023-09-08

## 2022-11-01 RX ORDER — METHOTREXATE 2.5 MG/1
10 TABLET ORAL
Qty: 20 TABLET | Refills: 5 | Status: SHIPPED | OUTPATIENT
Start: 2022-11-01 | End: 2023-05-05 | Stop reason: SDUPTHER

## 2022-11-01 RX ORDER — OMEPRAZOLE 40 MG/1
40 CAPSULE, DELAYED RELEASE ORAL EVERY MORNING
Qty: 30 CAPSULE | Refills: 5 | Status: SHIPPED | OUTPATIENT
Start: 2022-11-01 | End: 2023-05-05 | Stop reason: SDUPTHER

## 2022-11-01 RX ORDER — ROSUVASTATIN CALCIUM 20 MG/1
1 TABLET, COATED ORAL NIGHTLY
COMMUNITY
Start: 2022-06-22 | End: 2023-09-08

## 2022-11-01 RX ORDER — DULOXETIN HYDROCHLORIDE 60 MG/1
60 CAPSULE, DELAYED RELEASE ORAL EVERY MORNING
Qty: 30 CAPSULE | Refills: 5 | Status: SHIPPED | OUTPATIENT
Start: 2022-11-01 | End: 2023-05-05 | Stop reason: SDUPTHER

## 2022-11-01 RX ORDER — OMEPRAZOLE 40 MG/1
40 CAPSULE, DELAYED RELEASE ORAL EVERY MORNING
COMMUNITY
Start: 2022-08-20 | End: 2022-11-01 | Stop reason: SDUPTHER

## 2022-11-01 RX ORDER — FOLIC ACID 1 MG/1
1000 TABLET ORAL DAILY
COMMUNITY
Start: 2022-08-21 | End: 2022-11-01 | Stop reason: SDUPTHER

## 2022-11-01 RX ORDER — ASPIRIN 325 MG
50000 TABLET, DELAYED RELEASE (ENTERIC COATED) ORAL WEEKLY
Qty: 5 CAPSULE | Refills: 5 | Status: SHIPPED | OUTPATIENT
Start: 2022-11-01 | End: 2023-05-05 | Stop reason: SDUPTHER

## 2022-11-01 RX ORDER — ASPIRIN 325 MG
1250 TABLET, DELAYED RELEASE (ENTERIC COATED) ORAL WEEKLY
COMMUNITY
Start: 2021-09-10 | End: 2022-11-01 | Stop reason: SDUPTHER

## 2022-11-01 RX ORDER — DULOXETIN HYDROCHLORIDE 30 MG/1
60 CAPSULE, DELAYED RELEASE ORAL EVERY MORNING
COMMUNITY
Start: 2022-08-20 | End: 2022-11-01 | Stop reason: SDUPTHER

## 2022-11-01 RX ORDER — FOLIC ACID 1 MG/1
1000 TABLET ORAL DAILY
Qty: 30 TABLET | Refills: 5 | Status: SHIPPED | OUTPATIENT
Start: 2022-11-01 | End: 2023-05-05 | Stop reason: SDUPTHER

## 2022-11-01 RX ORDER — METHOTREXATE 2.5 MG/1
10 TABLET ORAL
COMMUNITY
Start: 2022-05-23 | End: 2022-11-01 | Stop reason: SDUPTHER

## 2022-11-01 NOTE — PROGRESS NOTES
"Subjective:       Patient ID: Virgie Light is a 57 y.o. female.    Chief Complaint: Follow-up (Patient is a 4 month f/u.)    The patient is complaining of joint pain involving the MCP PIP wrist elbow shoulders hips knees and ankles bilaterally.  The pain is 2/10 in intensity dull in quality and continuous.  That is associated with a morning stiffness lasting for less than 60 minutes.  Is also having difficulty maintaining a good night of sleep.  This has been associated with myalgias.  Muscle aches are 1/10 in intensity dull in quality and continuous.  They are associated with fatigue.  No fever no chills no others.      Review of Systems   Constitutional:  Negative for appetite change, chills and fever.   HENT:  Negative for congestion, ear pain, mouth sores, nosebleeds and trouble swallowing.    Eyes:  Negative for photophobia and discharge.   Respiratory:  Negative for chest tightness and shortness of breath.    Cardiovascular:  Negative for chest pain.   Gastrointestinal:  Negative for abdominal pain and vomiting.   Endocrine: Negative.    Genitourinary:  Negative for hematuria.   Musculoskeletal:         As per HPI   Skin:  Negative for rash.   Neurological:  Negative for weakness.       Objective:   /85 (BP Location: Right arm, Patient Position: Sitting, BP Method: Large (Automatic))   Pulse 74   Temp 98.3 °F (36.8 °C) (Oral)   Resp 16   Ht 5' 3" (1.6 m)   Wt 108.1 kg (238 lb 6.4 oz)   SpO2 98%   BMI 42.23 kg/m²      Physical Exam   Constitutional: She is oriented to person, place, and time. She appears well-developed and well-nourished. No distress.   HENT:   Head: Normocephalic and atraumatic.   Right Ear: External ear normal.   Left Ear: External ear normal.   Eyes: Pupils are equal, round, and reactive to light.   Cardiovascular: Normal rate, regular rhythm and normal heart sounds.   Pulmonary/Chest: Breath sounds normal.   Abdominal: Soft. There is no abdominal tenderness. "   Musculoskeletal:      Right shoulder: Normal.      Left shoulder: Normal.      Right elbow: Normal.      Left elbow: Normal.      Right wrist: Normal.      Left wrist: Normal.      Cervical back: Neck supple.      Right hip: Normal.      Left hip: Normal.      Right knee: Normal.      Left knee: Normal.   Lymphadenopathy:     She has no cervical adenopathy.   Neurological: She is alert and oriented to person, place, and time. She displays normal reflexes. No cranial nerve deficit or sensory deficit. She exhibits normal muscle tone. Coordination normal.   Skin: No rash noted. No erythema.   Vitals reviewed.      Right Side Rheumatological Exam     Examination finds the shoulder, elbow, wrist, knee, 1st PIP, 1st MCP, 2nd PIP, 2nd MCP, 3rd PIP, 3rd MCP, 4th PIP, 4th MCP, 5th PIP, 5th MCP, temporomandibular, hip, ankle, 1st MTP, 2nd MTP, 3rd MTP, 4th MTP and 5th MTP normal.    Left Side Rheumatological Exam     Examination finds the shoulder, elbow, wrist, knee, 1st PIP, 1st MCP, 2nd PIP, 2nd MCP, 3rd PIP, 3rd MCP, 4th PIP, 4th MCP, 5th PIP, 5th MCP, temporomandibular, hip, ankle, 1st MTP, 2nd MTP, 3rd MTP, 4th MTP and 5th MTP normal.       Completed Fibromyalgia exam 2/18 tender points.  No data to display     Assessment:       1. Undifferentiated connective tissue disease    2. Vitamin D deficiency    3. Fibromyalgia            Medication List with Changes/Refills   Current Medications    CLONIDINE (CATAPRES) 0.1 MG TABLET    Take 0.1 mg by mouth once. PRN       Start Date: --        End Date: --    ROSUVASTATIN (CRESTOR) 20 MG TABLET    Take 1 tablet by mouth every evening.       Start Date: 6/22/2022 End Date: --   Changed and/or Refilled Medications    Modified Medication Previous Medication    CHOLECALCIFEROL, VITAMIN D3, 1,250 MCG (50,000 UNIT) CAPSULE cholecalciferol, vitamin D3, 1,250 mcg (50,000 unit) capsule       Take 1 capsule (50,000 Units total) by mouth once a week.    Take 1,250 mcg by mouth once a  week.       Start Date: 11/1/2022 End Date: --    Start Date: 9/10/2021 End Date: 11/1/2022    DULOXETINE (CYMBALTA) 60 MG CAPSULE DULoxetine (CYMBALTA) 30 MG capsule       Take 1 capsule (60 mg total) by mouth every morning.    Take 60 mg by mouth every morning.       Start Date: 11/1/2022 End Date: --    Start Date: 8/20/2022 End Date: 11/1/2022    FOLIC ACID (FOLVITE) 1 MG TABLET folic acid (FOLVITE) 1 MG tablet       Take 1 tablet (1,000 mcg total) by mouth once daily.    Take 1,000 mcg by mouth once daily.       Start Date: 11/1/2022 End Date: --    Start Date: 8/21/2022 End Date: 11/1/2022    METHOTREXATE 2.5 MG TAB methotrexate 2.5 MG Tab       Take 4 tablets (10 mg total) by mouth every 7 days.    Take 10 mg by mouth every 7 days.       Start Date: 11/1/2022 End Date: --    Start Date: 5/23/2022 End Date: 11/1/2022    OMEPRAZOLE (PRILOSEC) 40 MG CAPSULE omeprazole (PRILOSEC) 40 MG capsule       Take 1 capsule (40 mg total) by mouth every morning.    Take 40 mg by mouth every morning.       Start Date: 11/1/2022 End Date: --    Start Date: 8/20/2022 End Date: 11/1/2022         Plan:       Problem List Items Addressed This Visit          Immunology/Multi System    Undifferentiated connective tissue disease - Primary    Relevant Medications    cholecalciferol, vitamin D3, 1,250 mcg (50,000 unit) capsule    DULoxetine (CYMBALTA) 60 MG capsule    folic acid (FOLVITE) 1 MG tablet    methotrexate 2.5 MG Tab    omeprazole (PRILOSEC) 40 MG capsule       Endocrine    Vitamin D deficiency    Relevant Medications    cholecalciferol, vitamin D3, 1,250 mcg (50,000 unit) capsule    DULoxetine (CYMBALTA) 60 MG capsule    folic acid (FOLVITE) 1 MG tablet    methotrexate 2.5 MG Tab    omeprazole (PRILOSEC) 40 MG capsule       Orthopedic    Fibromyalgia    Relevant Medications    cholecalciferol, vitamin D3, 1,250 mcg (50,000 unit) capsule    DULoxetine (CYMBALTA) 60 MG capsule    folic acid (FOLVITE) 1 MG tablet     methotrexate 2.5 MG Tab    omeprazole (PRILOSEC) 40 MG capsule

## 2022-11-08 RX ORDER — DICLOFENAC SODIUM 30 MG/G
GEL TOPICAL
Qty: 100 G | Refills: 0 | Status: SHIPPED | OUTPATIENT
Start: 2022-11-08 | End: 2023-02-06

## 2023-04-17 DIAGNOSIS — I26.99 PULMONARY EMBOLISM: Primary | ICD-10-CM

## 2023-05-05 ENCOUNTER — OFFICE VISIT (OUTPATIENT)
Dept: RHEUMATOLOGY | Facility: CLINIC | Age: 58
End: 2023-05-05
Payer: MEDICAID

## 2023-05-05 VITALS
BODY MASS INDEX: 44.86 KG/M2 | HEIGHT: 63 IN | OXYGEN SATURATION: 95 % | HEART RATE: 89 BPM | SYSTOLIC BLOOD PRESSURE: 136 MMHG | TEMPERATURE: 99 F | RESPIRATION RATE: 20 BRPM | WEIGHT: 253.19 LBS | DIASTOLIC BLOOD PRESSURE: 82 MMHG

## 2023-05-05 DIAGNOSIS — E55.9 VITAMIN D DEFICIENCY: ICD-10-CM

## 2023-05-05 DIAGNOSIS — M79.7 FIBROMYALGIA: ICD-10-CM

## 2023-05-05 DIAGNOSIS — M35.9 UNDIFFERENTIATED CONNECTIVE TISSUE DISEASE: Primary | ICD-10-CM

## 2023-05-05 DIAGNOSIS — F33.1 MODERATE EPISODE OF RECURRENT MAJOR DEPRESSIVE DISORDER: ICD-10-CM

## 2023-05-05 PROCEDURE — 3008F PR BODY MASS INDEX (BMI) DOCUMENTED: ICD-10-PCS | Mod: CPTII,,, | Performed by: INTERNAL MEDICINE

## 2023-05-05 PROCEDURE — 99213 OFFICE O/P EST LOW 20 MIN: CPT | Mod: PBBFAC | Performed by: INTERNAL MEDICINE

## 2023-05-05 PROCEDURE — 99214 OFFICE O/P EST MOD 30 MIN: CPT | Mod: S$PBB,,, | Performed by: INTERNAL MEDICINE

## 2023-05-05 PROCEDURE — 1159F PR MEDICATION LIST DOCUMENTED IN MEDICAL RECORD: ICD-10-PCS | Mod: CPTII,,, | Performed by: INTERNAL MEDICINE

## 2023-05-05 PROCEDURE — 3079F DIAST BP 80-89 MM HG: CPT | Mod: CPTII,,, | Performed by: INTERNAL MEDICINE

## 2023-05-05 PROCEDURE — 4010F PR ACE/ARB THEARPY RXD/TAKEN: ICD-10-PCS | Mod: CPTII,,, | Performed by: INTERNAL MEDICINE

## 2023-05-05 PROCEDURE — 3075F SYST BP GE 130 - 139MM HG: CPT | Mod: CPTII,,, | Performed by: INTERNAL MEDICINE

## 2023-05-05 PROCEDURE — 3008F BODY MASS INDEX DOCD: CPT | Mod: CPTII,,, | Performed by: INTERNAL MEDICINE

## 2023-05-05 PROCEDURE — 99214 PR OFFICE/OUTPT VISIT, EST, LEVL IV, 30-39 MIN: ICD-10-PCS | Mod: S$PBB,,, | Performed by: INTERNAL MEDICINE

## 2023-05-05 PROCEDURE — 1159F MED LIST DOCD IN RCRD: CPT | Mod: CPTII,,, | Performed by: INTERNAL MEDICINE

## 2023-05-05 PROCEDURE — 99999 PR PBB SHADOW E&M-EST. PATIENT-LVL III: ICD-10-PCS | Mod: PBBFAC,,, | Performed by: INTERNAL MEDICINE

## 2023-05-05 PROCEDURE — 99999 PR PBB SHADOW E&M-EST. PATIENT-LVL III: CPT | Mod: PBBFAC,,, | Performed by: INTERNAL MEDICINE

## 2023-05-05 PROCEDURE — 3075F PR MOST RECENT SYSTOLIC BLOOD PRESS GE 130-139MM HG: ICD-10-PCS | Mod: CPTII,,, | Performed by: INTERNAL MEDICINE

## 2023-05-05 PROCEDURE — 3079F PR MOST RECENT DIASTOLIC BLOOD PRESSURE 80-89 MM HG: ICD-10-PCS | Mod: CPTII,,, | Performed by: INTERNAL MEDICINE

## 2023-05-05 PROCEDURE — 4010F ACE/ARB THERAPY RXD/TAKEN: CPT | Mod: CPTII,,, | Performed by: INTERNAL MEDICINE

## 2023-05-05 RX ORDER — FLUTICASONE PROPIONATE AND SALMETEROL 50; 250 UG/1; UG/1
POWDER RESPIRATORY (INHALATION) 2 TIMES DAILY
COMMUNITY
Start: 2023-04-24

## 2023-05-05 RX ORDER — HYDROXYCHLOROQUINE SULFATE 200 MG/1
200 TABLET, FILM COATED ORAL 2 TIMES DAILY
COMMUNITY
Start: 2023-02-11 | End: 2023-05-05 | Stop reason: SDUPTHER

## 2023-05-05 RX ORDER — ASPIRIN 325 MG
50000 TABLET, DELAYED RELEASE (ENTERIC COATED) ORAL WEEKLY
Qty: 5 CAPSULE | Refills: 5 | Status: SHIPPED | OUTPATIENT
Start: 2023-05-05 | End: 2023-09-08 | Stop reason: SDUPTHER

## 2023-05-05 RX ORDER — ALBUTEROL SULFATE 90 UG/1
2 AEROSOL, METERED RESPIRATORY (INHALATION) 4 TIMES DAILY PRN
COMMUNITY
Start: 2023-05-01

## 2023-05-05 RX ORDER — LISINOPRIL 10 MG/1
10 TABLET ORAL DAILY
COMMUNITY
Start: 2023-04-19 | End: 2024-04-01 | Stop reason: SDUPTHER

## 2023-05-05 RX ORDER — APIXABAN 5 MG/1
5 TABLET, FILM COATED ORAL 2 TIMES DAILY
COMMUNITY
Start: 2023-04-20

## 2023-05-05 RX ORDER — METHOTREXATE 2.5 MG/1
10 TABLET ORAL
Qty: 20 TABLET | Refills: 5 | Status: SHIPPED | OUTPATIENT
Start: 2023-05-05 | End: 2023-09-08 | Stop reason: SDUPTHER

## 2023-05-05 RX ORDER — FUROSEMIDE 20 MG/1
20 TABLET ORAL DAILY
COMMUNITY
Start: 2023-04-17

## 2023-05-05 RX ORDER — HYDROXYCHLOROQUINE SULFATE 200 MG/1
200 TABLET, FILM COATED ORAL 2 TIMES DAILY
Qty: 60 TABLET | Refills: 5 | Status: SHIPPED | OUTPATIENT
Start: 2023-05-05 | End: 2023-09-08 | Stop reason: SDUPTHER

## 2023-05-05 RX ORDER — OMEPRAZOLE 40 MG/1
40 CAPSULE, DELAYED RELEASE ORAL EVERY MORNING
Qty: 30 CAPSULE | Refills: 5 | Status: SHIPPED | OUTPATIENT
Start: 2023-05-05 | End: 2023-09-08 | Stop reason: SDUPTHER

## 2023-05-05 RX ORDER — DULOXETIN HYDROCHLORIDE 60 MG/1
60 CAPSULE, DELAYED RELEASE ORAL EVERY MORNING
Qty: 30 CAPSULE | Refills: 5 | Status: SHIPPED | OUTPATIENT
Start: 2023-05-05 | End: 2023-09-08 | Stop reason: SDUPTHER

## 2023-05-05 RX ORDER — FOLIC ACID 1 MG/1
1000 TABLET ORAL DAILY
Qty: 30 TABLET | Refills: 5 | Status: SHIPPED | OUTPATIENT
Start: 2023-05-05 | End: 2023-09-08 | Stop reason: SDUPTHER

## 2023-05-05 NOTE — PROGRESS NOTES
"Subjective:       Patient ID: Virgie Light is a 57 y.o. female.    Chief Complaint: Follow-up (Pt voiced no concerns. Pt also needs refills. )    The patient is complaining of joint pain involving the MCP PIP wrist elbow shoulders hips knees and ankles bilaterally.  The pain is 1/10 in intensity dull in quality and continuous.  That is associated with a morning stiffness lasting for less than 60 minutes.  Is also having difficulty maintaining a good night of sleep.  This has been associated with myalgias.  Muscle aches are 1/10 in intensity dull in quality and continuous.  They are associated with fatigue.  No fever no chills no others.  Had a PE recently    Review of Systems   Constitutional:  Negative for appetite change, chills and fever.   HENT:  Negative for congestion, ear pain, mouth sores, nosebleeds and trouble swallowing.    Eyes:  Negative for photophobia and discharge.   Respiratory:  Negative for chest tightness and shortness of breath.    Cardiovascular:  Negative for chest pain.   Gastrointestinal:  Negative for abdominal pain and vomiting.   Endocrine: Negative.    Genitourinary:  Negative for hematuria.   Musculoskeletal:         As per HPI   Skin:  Negative for rash.   Neurological:  Negative for weakness.       Objective:   /82 (BP Location: Right arm, Patient Position: Sitting, BP Method: Large (Automatic))   Pulse 89   Temp 98.8 °F (37.1 °C) (Oral)   Resp 20   Ht 5' 3" (1.6 m)   Wt 114.9 kg (253 lb 3.2 oz)   SpO2 95%   BMI 44.85 kg/m²      Physical Exam   Constitutional: She is oriented to person, place, and time. She appears well-developed and well-nourished. No distress.   HENT:   Head: Normocephalic and atraumatic.   Right Ear: External ear normal.   Left Ear: External ear normal.   Eyes: Pupils are equal, round, and reactive to light.   Cardiovascular: Normal rate, regular rhythm and normal heart sounds.   Pulmonary/Chest: Breath sounds normal.   Abdominal: Soft. There is no " abdominal tenderness.   Musculoskeletal:      Right shoulder: Normal.      Left shoulder: Normal.      Right elbow: Normal.      Left elbow: Normal.      Right wrist: Normal.      Left wrist: Normal.      Cervical back: Neck supple.      Right hip: Normal.      Left hip: Normal.      Right knee: Normal.      Left knee: Normal.   Lymphadenopathy:     She has no cervical adenopathy.   Neurological: She is alert and oriented to person, place, and time. She displays normal reflexes. No cranial nerve deficit or sensory deficit. She exhibits normal muscle tone. Coordination normal.   Skin: No rash noted. No erythema.   Vitals reviewed.      Right Side Rheumatological Exam     Examination finds the shoulder, elbow, wrist, knee, 1st PIP, 1st MCP, 2nd PIP, 2nd MCP, 3rd PIP, 3rd MCP, 4th PIP, 4th MCP, 5th PIP, 5th MCP, temporomandibular, hip, ankle, 1st MTP, 2nd MTP, 3rd MTP, 4th MTP and 5th MTP normal.    Left Side Rheumatological Exam     Examination finds the shoulder, elbow, wrist, knee, 1st PIP, 1st MCP, 2nd PIP, 2nd MCP, 3rd PIP, 3rd MCP, 4th PIP, 4th MCP, 5th PIP, 5th MCP, temporomandibular, hip, ankle, 1st MTP, 2nd MTP, 3rd MTP, 4th MTP and 5th MTP normal.       Completed Fibromyalgia exam 2/18 tender points.  No data to display     Assessment:       1. Undifferentiated connective tissue disease    2. Vitamin D deficiency    3. Fibromyalgia    4. Moderate episode of recurrent major depressive disorder          Medication List with Changes/Refills   Current Medications    ADVAIR DISKUS 250-50 MCG/DOSE DISKUS INHALER    2 (two) times daily.       Start Date: 4/24/2023 End Date: --    ALBUTEROL (PROVENTIL/VENTOLIN HFA) 90 MCG/ACTUATION INHALER    2 puffs 4 (four) times daily as needed.       Start Date: 5/1/2023  End Date: --    CLONIDINE (CATAPRES) 0.1 MG TABLET    Take 0.1 mg by mouth once. PRN       Start Date: --        End Date: --    DICLOFENAC SODIUM (SOLARAZE) 3 % GEL    APPLY 0.5 GRAMS TOPICALLY TO AFFECTED AREA  TWICE DAILY       Start Date: 2/6/2023  End Date: --    ELIQUIS 5 MG TAB    Take by mouth.       Start Date: 4/20/2023 End Date: --    FUROSEMIDE (LASIX) 20 MG TABLET    Take 20 mg by mouth.       Start Date: 4/17/2023 End Date: --    LISINOPRIL 10 MG TABLET    Take 10 mg by mouth.       Start Date: 4/19/2023 End Date: --    ROSUVASTATIN (CRESTOR) 20 MG TABLET    Take 1 tablet by mouth every evening.       Start Date: 6/22/2022 End Date: --   Changed and/or Refilled Medications    Modified Medication Previous Medication    CHOLECALCIFEROL, VITAMIN D3, 1,250 MCG (50,000 UNIT) CAPSULE cholecalciferol, vitamin D3, 1,250 mcg (50,000 unit) capsule       Take 1 capsule (50,000 Units total) by mouth once a week.    Take 1 capsule (50,000 Units total) by mouth once a week.       Start Date: 5/5/2023  End Date: --    Start Date: 11/1/2022 End Date: 5/5/2023    DULOXETINE (CYMBALTA) 60 MG CAPSULE DULoxetine (CYMBALTA) 60 MG capsule       Take 1 capsule (60 mg total) by mouth every morning.    Take 1 capsule (60 mg total) by mouth every morning.       Start Date: 5/5/2023  End Date: --    Start Date: 11/1/2022 End Date: 5/5/2023    FOLIC ACID (FOLVITE) 1 MG TABLET folic acid (FOLVITE) 1 MG tablet       Take 1 tablet (1,000 mcg total) by mouth once daily.    Take 1 tablet (1,000 mcg total) by mouth once daily.       Start Date: 5/5/2023  End Date: --    Start Date: 11/1/2022 End Date: 5/5/2023    HYDROXYCHLOROQUINE (PLAQUENIL) 200 MG TABLET hydrOXYchloroQUINE (PLAQUENIL) 200 mg tablet       Take 1 tablet (200 mg total) by mouth 2 (two) times daily.    Take 200 mg by mouth 2 (two) times daily.       Start Date: 5/5/2023  End Date: --    Start Date: 2/11/2023 End Date: 5/5/2023    METHOTREXATE 2.5 MG TAB methotrexate 2.5 MG Tab       Take 4 tablets (10 mg total) by mouth every 7 days.    Take 4 tablets (10 mg total) by mouth every 7 days.       Start Date: 5/5/2023  End Date: --    Start Date: 11/1/2022 End Date: 5/5/2023     OMEPRAZOLE (PRILOSEC) 40 MG CAPSULE omeprazole (PRILOSEC) 40 MG capsule       Take 1 capsule (40 mg total) by mouth every morning.    Take 1 capsule (40 mg total) by mouth every morning.       Start Date: 5/5/2023  End Date: --    Start Date: 11/1/2022 End Date: 5/5/2023         Plan:         Problem List Items Addressed This Visit          Psychiatric    Moderate episode of recurrent major depressive disorder    Relevant Medications    cholecalciferol, vitamin D3, 1,250 mcg (50,000 unit) capsule    DULoxetine (CYMBALTA) 60 MG capsule    hydrOXYchloroQUINE (PLAQUENIL) 200 mg tablet    methotrexate 2.5 MG Tab    omeprazole (PRILOSEC) 40 MG capsule    folic acid (FOLVITE) 1 MG tablet       Immunology/Multi System    Undifferentiated connective tissue disease - Primary    Relevant Medications    cholecalciferol, vitamin D3, 1,250 mcg (50,000 unit) capsule    DULoxetine (CYMBALTA) 60 MG capsule    hydrOXYchloroQUINE (PLAQUENIL) 200 mg tablet    methotrexate 2.5 MG Tab    omeprazole (PRILOSEC) 40 MG capsule    folic acid (FOLVITE) 1 MG tablet       Endocrine    Vitamin D deficiency    Relevant Medications    cholecalciferol, vitamin D3, 1,250 mcg (50,000 unit) capsule    DULoxetine (CYMBALTA) 60 MG capsule    hydrOXYchloroQUINE (PLAQUENIL) 200 mg tablet    methotrexate 2.5 MG Tab    omeprazole (PRILOSEC) 40 MG capsule    folic acid (FOLVITE) 1 MG tablet       Orthopedic    Fibromyalgia    Relevant Medications    cholecalciferol, vitamin D3, 1,250 mcg (50,000 unit) capsule    DULoxetine (CYMBALTA) 60 MG capsule    hydrOXYchloroQUINE (PLAQUENIL) 200 mg tablet    methotrexate 2.5 MG Tab    omeprazole (PRILOSEC) 40 MG capsule    folic acid (FOLVITE) 1 MG tablet

## 2023-06-08 DIAGNOSIS — M79.7 FIBROMYALGIA: ICD-10-CM

## 2023-06-08 RX ORDER — DICLOFENAC SODIUM 30 MG/G
GEL TOPICAL
Qty: 100 G | Refills: 0 | Status: SHIPPED | OUTPATIENT
Start: 2023-06-08 | End: 2023-09-12

## 2023-06-22 ENCOUNTER — OFFICE VISIT (OUTPATIENT)
Dept: HEMATOLOGY/ONCOLOGY | Facility: CLINIC | Age: 58
End: 2023-06-22
Payer: MEDICAID

## 2023-06-22 VITALS
TEMPERATURE: 99 F | RESPIRATION RATE: 18 BRPM | BODY MASS INDEX: 44.79 KG/M2 | HEIGHT: 63 IN | DIASTOLIC BLOOD PRESSURE: 83 MMHG | HEART RATE: 90 BPM | WEIGHT: 252.81 LBS | SYSTOLIC BLOOD PRESSURE: 137 MMHG | OXYGEN SATURATION: 99 %

## 2023-06-22 DIAGNOSIS — I26.99 OTHER ACUTE PULMONARY EMBOLISM WITHOUT ACUTE COR PULMONALE: ICD-10-CM

## 2023-06-22 DIAGNOSIS — I26.99 PULMONARY EMBOLISM: ICD-10-CM

## 2023-06-22 PROCEDURE — 1159F MED LIST DOCD IN RCRD: CPT | Mod: CPTII,,, | Performed by: STUDENT IN AN ORGANIZED HEALTH CARE EDUCATION/TRAINING PROGRAM

## 2023-06-22 PROCEDURE — 3075F SYST BP GE 130 - 139MM HG: CPT | Mod: CPTII,,, | Performed by: STUDENT IN AN ORGANIZED HEALTH CARE EDUCATION/TRAINING PROGRAM

## 2023-06-22 PROCEDURE — 3075F PR MOST RECENT SYSTOLIC BLOOD PRESS GE 130-139MM HG: ICD-10-PCS | Mod: CPTII,,, | Performed by: STUDENT IN AN ORGANIZED HEALTH CARE EDUCATION/TRAINING PROGRAM

## 2023-06-22 PROCEDURE — 3008F PR BODY MASS INDEX (BMI) DOCUMENTED: ICD-10-PCS | Mod: CPTII,,, | Performed by: STUDENT IN AN ORGANIZED HEALTH CARE EDUCATION/TRAINING PROGRAM

## 2023-06-22 PROCEDURE — 4010F ACE/ARB THERAPY RXD/TAKEN: CPT | Mod: CPTII,,, | Performed by: STUDENT IN AN ORGANIZED HEALTH CARE EDUCATION/TRAINING PROGRAM

## 2023-06-22 PROCEDURE — 99204 OFFICE O/P NEW MOD 45 MIN: CPT | Mod: ,,, | Performed by: STUDENT IN AN ORGANIZED HEALTH CARE EDUCATION/TRAINING PROGRAM

## 2023-06-22 PROCEDURE — 3079F DIAST BP 80-89 MM HG: CPT | Mod: CPTII,,, | Performed by: STUDENT IN AN ORGANIZED HEALTH CARE EDUCATION/TRAINING PROGRAM

## 2023-06-22 PROCEDURE — 3079F PR MOST RECENT DIASTOLIC BLOOD PRESSURE 80-89 MM HG: ICD-10-PCS | Mod: CPTII,,, | Performed by: STUDENT IN AN ORGANIZED HEALTH CARE EDUCATION/TRAINING PROGRAM

## 2023-06-22 PROCEDURE — 3008F BODY MASS INDEX DOCD: CPT | Mod: CPTII,,, | Performed by: STUDENT IN AN ORGANIZED HEALTH CARE EDUCATION/TRAINING PROGRAM

## 2023-06-22 PROCEDURE — 99204 PR OFFICE/OUTPT VISIT, NEW, LEVL IV, 45-59 MIN: ICD-10-PCS | Mod: ,,, | Performed by: STUDENT IN AN ORGANIZED HEALTH CARE EDUCATION/TRAINING PROGRAM

## 2023-06-22 PROCEDURE — 1159F PR MEDICATION LIST DOCUMENTED IN MEDICAL RECORD: ICD-10-PCS | Mod: CPTII,,, | Performed by: STUDENT IN AN ORGANIZED HEALTH CARE EDUCATION/TRAINING PROGRAM

## 2023-06-22 PROCEDURE — 4010F PR ACE/ARB THEARPY RXD/TAKEN: ICD-10-PCS | Mod: CPTII,,, | Performed by: STUDENT IN AN ORGANIZED HEALTH CARE EDUCATION/TRAINING PROGRAM

## 2023-06-23 PROBLEM — I26.99 ACUTE PULMONARY EMBOLISM WITHOUT ACUTE COR PULMONALE: Status: ACTIVE | Noted: 2023-06-23

## 2023-06-23 NOTE — PROGRESS NOTES
Referring provider:  [facility] [place] [facility] [place]    Reason for consultation:  Pulmonary embolism      Diagnosis:  Unprovoked PE      HPI:    Patient is a 57-year-old with history of hypertension, hyperlipidemia, prior provoked PE who presented to the ER with symptoms of shortness of breath and chest pain for through to 3 days prior to presentation in 2023.  She had a CT chest PE protocol done due to elevated D-dimer and was found to have multiple right-sided pulmonary embolism.  Patient was started on Eliquis in the hospital and discharged home thereafter.  Patient presented to clinic on 2023 to establish care to discuss further treatment recommendations.  She denied any history of prolonged immobilization during travel prior to her diagnosis.  She denied any direct or indirect trauma, falls.    Of note, patient had a prior history of provoked PE after her hernia repair for which she was on Eiquis for 3 years in the past.    She denies smoking, alcohol or recreational drug use.  She denies over-the-counter herbs, supplements, hormone replacement.  Patient denies any family history of DVT/PE.  He does have history of ovarian cancer in her family in her mother, prostate cancer in her father and ovarian cancer in other family member.  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    Interval history:    Today, 2023, she reports tolerating Eliquis well without significant side effects.  She reports resolution of symptoms of shortness of breath and chest pain.  She denies any significant symptoms of bleeding.    Past Medical History:   Diagnosis Date    Acid reflux     Hyperlipidemia     Hypertension        Past Surgical History:   Procedure Laterality Date     SECTION      HERNIA REPAIR      OVARY SURGERY      TUBAL LIGATION         Family History   Problem Relation Age of Onset    COPD Mother     Diabetes Mellitus Mother     Hypertension  Mother     Cancer Mother     Hypertension Father     Diabetes Mellitus Father     Cancer Father     Dementia Father        Social History     Socioeconomic History    Marital status: Single   Tobacco Use    Smoking status: Never     Passive exposure: Never    Smokeless tobacco: Never   Substance and Sexual Activity    Alcohol use: Never    Drug use: Never       Current Outpatient Medications   Medication Sig Dispense Refill    ADVAIR DISKUS 250-50 mcg/dose diskus inhaler 2 (two) times daily.      albuterol (PROVENTIL/VENTOLIN HFA) 90 mcg/actuation inhaler 2 puffs 4 (four) times daily as needed.      cholecalciferol, vitamin D3, 1,250 mcg (50,000 unit) capsule Take 1 capsule (50,000 Units total) by mouth once a week. 5 capsule 5    diclofenac sodium (SOLARAZE) 3 % gel APPLY 1/2 GRAM TOPICALLY TO AFFECTED AREA TWICE DAILY 100 g 0    DULoxetine (CYMBALTA) 60 MG capsule Take 1 capsule (60 mg total) by mouth every morning. 30 capsule 5    ELIQUIS 5 mg Tab Take by mouth.      folic acid (FOLVITE) 1 MG tablet Take 1 tablet (1,000 mcg total) by mouth once daily. 30 tablet 5    furosemide (LASIX) 20 MG tablet Take 20 mg by mouth.      hydrOXYchloroQUINE (PLAQUENIL) 200 mg tablet Take 1 tablet (200 mg total) by mouth 2 (two) times daily. 60 tablet 5    lisinopriL 10 MG tablet Take 10 mg by mouth.      methotrexate 2.5 MG Tab Take 4 tablets (10 mg total) by mouth every 7 days. 20 tablet 5    omeprazole (PRILOSEC) 40 MG capsule Take 1 capsule (40 mg total) by mouth every morning. 30 capsule 5    rosuvastatin (CRESTOR) 20 MG tablet Take 1 tablet by mouth every evening.      cloNIDine (CATAPRES) 0.1 MG tablet Take 0.1 mg by mouth once. PRN       No current facility-administered medications for this visit.       Review of patient's allergies indicates:   Allergen Reactions    Opioids - morphine analogues Other (See Comments)     Makes her feel hot and bad    Shrimp Itching    Theophenyllin Palpitations         Review of  systems  CONSTITUTIONAL: no fevers, no chills, no weight loss, no fatigue, no weakness  HEMATOLOGIC: no abnormal bleeding, no abnormal bruising, no drenching night sweats  ONCOLOGIC: no new masses or lumps  HEENT: no vision loss, no tinnitus or hearing loss, no nose bleeding, no dysphagia, no odynophagia  CVS: no chest pain, no palpitations, no dyspnea on exertion  RESP: no shortness of breath, no hemoptysis, no cough  GI: no nausea, no vomiting, no diarrhea, no constipation, no melena, no hematochezia, no hematemesis, no abdominal pain, no increase in abdominal girth  : no dysuria, no hematuria, no hesitancy, no scrotal swelling, no discharge  INTEGUMENT: no rashes, no abnormal bruising, no nail pitting, no hyperpigmentation  NEURO: no falls, no memory loss, no paresthesias or dysesthesias, no urofecal incontinence or retention, no loss of strength on any extremity  MSK: no back pain, no new joint pain, no joint swelling  PSYCH: no suicidal or homicidal ideation, no depression, no insomnia, no anhedonia  ENDOCRINE: no heat or cold intolerance, no polyuria, no polydipsia      Physical Exam:  Vitals:    06/22/23 1457   BP: 137/83   Pulse: 90   Resp: 18   Temp: 98.8 °F (37.1 °C)       ECOG PS 0  GA: AAOx3, NAD  HEENT: NCAT, PERRLA, EOMI, good dentition, moist oral mucous membranes  LYMPH: no cervical, axillary or supraclavicular adenopathy  CVS: s1s2 RRR, no M/R/G  RESP: CTA b/l, no crackles, no wheezes or rhonchi  ABD: soft, NT, ND, BS+, no hepatosplenomegaly  EXT: no deformities, no pedal edema  SKIN: no rashes, warm and dry  NEURO: normal mentation, strength 5/5 on all 4 extremities, no sensory deficits        Assessment    # Unprovoked PE  Patient was diagnosed with unprovoked PE in April 2023 after she presented with symptoms of shortness chest pain   She had a prior diagnosis of provoked PE after surgery in 2014 for which she was on anticoagulation up until 2018   Patient denies any family history of DVT/PE    Patient was obese however does not have any other risk factors.  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      Plan   Will obtain CBC, CMP, hypercoagulable workup today   Plan to follow-up in 4-6 weeks to discuss above workup and further recommendations   Continue Eliquis 5 mg b.i.d., as recommended by Cardiology.      A total of  45 minutes were spent in review of records, interpretation of test, coordination of care, discussion and counseling with the patient.        Portions of the record may have been created with voice recognition software. Occasional wrong-word or sound-a-like substitutions may have occurred due to the inherent limitations of voice recognition software. Read the chart carefully and recognize, using context, where substitutions have occurred.

## 2023-08-03 ENCOUNTER — OFFICE VISIT (OUTPATIENT)
Dept: HEMATOLOGY/ONCOLOGY | Facility: CLINIC | Age: 58
End: 2023-08-03
Payer: MEDICAID

## 2023-08-03 VITALS
TEMPERATURE: 98 F | SYSTOLIC BLOOD PRESSURE: 155 MMHG | OXYGEN SATURATION: 100 % | BODY MASS INDEX: 44.56 KG/M2 | RESPIRATION RATE: 18 BRPM | HEIGHT: 63 IN | HEART RATE: 90 BPM | WEIGHT: 251.5 LBS | DIASTOLIC BLOOD PRESSURE: 86 MMHG

## 2023-08-03 DIAGNOSIS — Z79.01 CURRENT USE OF LONG TERM ANTICOAGULATION: ICD-10-CM

## 2023-08-03 DIAGNOSIS — I26.99 OTHER ACUTE PULMONARY EMBOLISM WITHOUT ACUTE COR PULMONALE: Primary | ICD-10-CM

## 2023-08-03 PROCEDURE — 3077F PR MOST RECENT SYSTOLIC BLOOD PRESSURE >= 140 MM HG: ICD-10-PCS | Mod: CPTII,,, | Performed by: STUDENT IN AN ORGANIZED HEALTH CARE EDUCATION/TRAINING PROGRAM

## 2023-08-03 PROCEDURE — 3008F BODY MASS INDEX DOCD: CPT | Mod: CPTII,,, | Performed by: STUDENT IN AN ORGANIZED HEALTH CARE EDUCATION/TRAINING PROGRAM

## 2023-08-03 PROCEDURE — 3008F PR BODY MASS INDEX (BMI) DOCUMENTED: ICD-10-PCS | Mod: CPTII,,, | Performed by: STUDENT IN AN ORGANIZED HEALTH CARE EDUCATION/TRAINING PROGRAM

## 2023-08-03 PROCEDURE — 1159F PR MEDICATION LIST DOCUMENTED IN MEDICAL RECORD: ICD-10-PCS | Mod: CPTII,,, | Performed by: STUDENT IN AN ORGANIZED HEALTH CARE EDUCATION/TRAINING PROGRAM

## 2023-08-03 PROCEDURE — 3079F DIAST BP 80-89 MM HG: CPT | Mod: CPTII,,, | Performed by: STUDENT IN AN ORGANIZED HEALTH CARE EDUCATION/TRAINING PROGRAM

## 2023-08-03 PROCEDURE — 3079F PR MOST RECENT DIASTOLIC BLOOD PRESSURE 80-89 MM HG: ICD-10-PCS | Mod: CPTII,,, | Performed by: STUDENT IN AN ORGANIZED HEALTH CARE EDUCATION/TRAINING PROGRAM

## 2023-08-03 PROCEDURE — 4010F ACE/ARB THERAPY RXD/TAKEN: CPT | Mod: CPTII,,, | Performed by: STUDENT IN AN ORGANIZED HEALTH CARE EDUCATION/TRAINING PROGRAM

## 2023-08-03 PROCEDURE — 99214 PR OFFICE/OUTPT VISIT, EST, LEVL IV, 30-39 MIN: ICD-10-PCS | Mod: ,,, | Performed by: STUDENT IN AN ORGANIZED HEALTH CARE EDUCATION/TRAINING PROGRAM

## 2023-08-03 PROCEDURE — 1159F MED LIST DOCD IN RCRD: CPT | Mod: CPTII,,, | Performed by: STUDENT IN AN ORGANIZED HEALTH CARE EDUCATION/TRAINING PROGRAM

## 2023-08-03 PROCEDURE — 4010F PR ACE/ARB THEARPY RXD/TAKEN: ICD-10-PCS | Mod: CPTII,,, | Performed by: STUDENT IN AN ORGANIZED HEALTH CARE EDUCATION/TRAINING PROGRAM

## 2023-08-03 PROCEDURE — 99214 OFFICE O/P EST MOD 30 MIN: CPT | Mod: ,,, | Performed by: STUDENT IN AN ORGANIZED HEALTH CARE EDUCATION/TRAINING PROGRAM

## 2023-08-03 PROCEDURE — 3077F SYST BP >= 140 MM HG: CPT | Mod: CPTII,,, | Performed by: STUDENT IN AN ORGANIZED HEALTH CARE EDUCATION/TRAINING PROGRAM

## 2023-08-03 RX ORDER — METFORMIN HYDROCHLORIDE 500 MG/1
500 TABLET ORAL
COMMUNITY
Start: 2023-07-26

## 2023-08-03 NOTE — PROGRESS NOTES
Referring provider:  [facility] [place] [facility] consultation    Reason for consultation:  Pulmonary embolism      Diagnosis:  Unprovoked PE      HPI:    Patient is a 57-year-old with history of hypertension, hyperlipidemia, prior provoked PE who presented to the ER with symptoms of shortness of breath and chest pain for through to 3 days prior to presentation in April 2023.  She had a CT chest PE protocol done due to elevated D-dimer and was found to have multiple right-sided pulmonary embolism.  Patient was started on Eliquis in the hospital and discharged home thereafter.  Patient presented to clinic on 06/23/2023 to establish care to discuss further treatment recommendations.  She denied any history of prolonged immobilization during travel prior to her diagnosis.  She denied any direct or indirect trauma, falls.    Of note, patient had a prior history of provoked PE after her hernia repair for which she was on Eiquis for 3 years in the past.    She denies smoking, alcohol or recreational drug use.  She denies over-the-counter herbs, supplements, hormone replacement.  Patient denies any family history of DVT/PE.  He does have history of ovarian cancer in her family in her mother, prostate cancer in her father and ovarian cancer in other family member.  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    Interval history:    Today, 08/03/2023, patient denies any acute concerns,, she reports tolerating Eliquis well without significant side effects.  She reports resolution of symptoms of shortness of breath and chest pain.  She denies any significant symptoms of bleeding.      Laboratory    06/23/2023 creatinine 0.75, CMP unremarkable, WBC count 4.6, hemoglobin 11.8, MCV 86.9, platelet count 222, ANC 2.5, antithrombin 3 activity 115%, beta 2 glycoprotein IgG and IgM within normal limits, anticardiolipin IgG/IgM within normal limits, factor 5 Leiden not detected, lupus  anticoagulant not detected, protein C functional 130%, protein S functional 111%, prothrombin gene mutation not detected,    Past Medical History:   Diagnosis Date    Acid reflux     Hyperlipidemia     Hypertension        Past Surgical History:   Procedure Laterality Date     SECTION      HERNIA REPAIR      OVARY SURGERY      TUBAL LIGATION         Family History   Problem Relation Age of Onset    COPD Mother     Diabetes Mellitus Mother     Hypertension Mother     Cancer Mother     Hypertension Father     Diabetes Mellitus Father     Cancer Father     Dementia Father        Social History     Socioeconomic History    Marital status: Single   Tobacco Use    Smoking status: Never     Passive exposure: Never    Smokeless tobacco: Never   Substance and Sexual Activity    Alcohol use: Never    Drug use: Never       Current Outpatient Medications   Medication Sig Dispense Refill    ADVAIR DISKUS 250-50 mcg/dose diskus inhaler 2 (two) times daily.      albuterol (PROVENTIL/VENTOLIN HFA) 90 mcg/actuation inhaler 2 puffs 4 (four) times daily as needed.      cholecalciferol, vitamin D3, 1,250 mcg (50,000 unit) capsule Take 1 capsule (50,000 Units total) by mouth once a week. 5 capsule 5    cloNIDine (CATAPRES) 0.1 MG tablet Take 0.1 mg by mouth once. PRN      diclofenac sodium (SOLARAZE) 3 % gel APPLY 1/2 GRAM TOPICALLY TO AFFECTED AREA TWICE DAILY 100 g 0    DULoxetine (CYMBALTA) 60 MG capsule Take 1 capsule (60 mg total) by mouth every morning. 30 capsule 5    ELIQUIS 5 mg Tab Take by mouth.      folic acid (FOLVITE) 1 MG tablet Take 1 tablet (1,000 mcg total) by mouth once daily. 30 tablet 5    furosemide (LASIX) 20 MG tablet Take 20 mg by mouth.      hydrOXYchloroQUINE (PLAQUENIL) 200 mg tablet Take 1 tablet (200 mg total) by mouth 2 (two) times daily. 60 tablet 5    lisinopriL 10 MG tablet Take 10 mg by mouth.      methotrexate 2.5 MG Tab Take 4 tablets (10 mg total) by mouth every 7 days. 20 tablet 5     omeprazole (PRILOSEC) 40 MG capsule Take 1 capsule (40 mg total) by mouth every morning. 30 capsule 5    rosuvastatin (CRESTOR) 20 MG tablet Take 1 tablet by mouth every evening.       No current facility-administered medications for this visit.       Review of patient's allergies indicates:   Allergen Reactions    Opioids - morphine analogues Other (See Comments)     Makes her feel hot and bad    Shrimp Itching    Theophenyllin Palpitations         Review of systems  CONSTITUTIONAL: no fevers, no chills, no weight loss, no fatigue, no weakness  HEMATOLOGIC: no abnormal bleeding, no abnormal bruising, no drenching night sweats  ONCOLOGIC: no new masses or lumps  HEENT: no vision loss, no tinnitus or hearing loss, no nose bleeding, no dysphagia, no odynophagia  CVS: no chest pain, no palpitations, no dyspnea on exertion  RESP: no shortness of breath, no hemoptysis, no cough  GI: no nausea, no vomiting, no diarrhea, no constipation, no melena, no hematochezia, no hematemesis, no abdominal pain, no increase in abdominal girth  : no dysuria, no hematuria, no hesitancy, no scrotal swelling, no discharge  INTEGUMENT: no rashes, no abnormal bruising, no nail pitting, no hyperpigmentation  NEURO: no falls, no memory loss, no paresthesias or dysesthesias, no urofecal incontinence or retention, no loss of strength on any extremity  MSK: no back pain, no new joint pain, no joint swelling  PSYCH: no suicidal or homicidal ideation, no depression, no insomnia, no anhedonia  ENDOCRINE: no heat or cold intolerance, no polyuria, no polydipsia      Physical Exam:  There were no vitals filed for this visit.      ECOG PS 0  GA: AAOx3, NAD  HEENT: NCAT, PERRLA, EOMI, good dentition, moist oral mucous membranes  LYMPH: no cervical, axillary or supraclavicular adenopathy  CVS: s1s2 RRR, no M/R/G  RESP: CTA b/l, no crackles, no wheezes or rhonchi  ABD: soft, NT, ND, BS+, no hepatosplenomegaly  EXT: no deformities, no pedal edema  SKIN:  no rashes, warm and dry  NEURO: normal mentation, strength 5/5 on all 4 extremities, no sensory deficits        Assessment    # Unprovoked PE  Patient was diagnosed with unprovoked PE in April 2023 after she presented with symptoms of shortness chest pain   She had a prior diagnosis of provoked PE after surgery in 2014 for which she was on anticoagulation up until 2018   Patient denies any family history of DVT/PE   Patient was obese however does not have any other risk factors.  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  Discussed the diagnosis, treatment recommendations with Eliquis, likely indefinitely given the unprovoked nature of her most recent PE.      Plan   Continue Eliquis 5 mg b.i.d.   Follow-up in clinic in 6 months with repeat labs.    A total of  30 minutes were spent in review of records, interpretation of test, coordination of care, discussion and counseling with the patient.        Portions of the record may have been created with voice recognition software. Occasional wrong-word or sound-a-like substitutions may have occurred due to the inherent limitations of voice recognition software. Read the chart carefully and recognize, using context, where substitutions have occurred.

## 2023-09-08 ENCOUNTER — OFFICE VISIT (OUTPATIENT)
Dept: RHEUMATOLOGY | Facility: CLINIC | Age: 58
End: 2023-09-08
Payer: MEDICAID

## 2023-09-08 VITALS
DIASTOLIC BLOOD PRESSURE: 73 MMHG | WEIGHT: 247.63 LBS | HEIGHT: 63 IN | BODY MASS INDEX: 43.88 KG/M2 | OXYGEN SATURATION: 98 % | TEMPERATURE: 99 F | HEART RATE: 76 BPM | SYSTOLIC BLOOD PRESSURE: 131 MMHG

## 2023-09-08 DIAGNOSIS — Z79.01 CURRENT USE OF LONG TERM ANTICOAGULATION: ICD-10-CM

## 2023-09-08 DIAGNOSIS — M35.9 UNDIFFERENTIATED CONNECTIVE TISSUE DISEASE: Primary | ICD-10-CM

## 2023-09-08 DIAGNOSIS — F33.1 MODERATE EPISODE OF RECURRENT MAJOR DEPRESSIVE DISORDER: ICD-10-CM

## 2023-09-08 DIAGNOSIS — E55.9 VITAMIN D DEFICIENCY: ICD-10-CM

## 2023-09-08 DIAGNOSIS — I26.99 OTHER ACUTE PULMONARY EMBOLISM WITHOUT ACUTE COR PULMONALE: ICD-10-CM

## 2023-09-08 DIAGNOSIS — M79.7 FIBROMYALGIA: ICD-10-CM

## 2023-09-08 PROCEDURE — 3078F PR MOST RECENT DIASTOLIC BLOOD PRESSURE < 80 MM HG: ICD-10-PCS | Mod: CPTII,,, | Performed by: INTERNAL MEDICINE

## 2023-09-08 PROCEDURE — 4010F PR ACE/ARB THEARPY RXD/TAKEN: ICD-10-PCS | Mod: CPTII,,, | Performed by: INTERNAL MEDICINE

## 2023-09-08 PROCEDURE — 1160F RVW MEDS BY RX/DR IN RCRD: CPT | Mod: CPTII,,, | Performed by: INTERNAL MEDICINE

## 2023-09-08 PROCEDURE — 99214 PR OFFICE/OUTPT VISIT, EST, LEVL IV, 30-39 MIN: ICD-10-PCS | Mod: S$PBB,,, | Performed by: INTERNAL MEDICINE

## 2023-09-08 PROCEDURE — 1159F PR MEDICATION LIST DOCUMENTED IN MEDICAL RECORD: ICD-10-PCS | Mod: CPTII,,, | Performed by: INTERNAL MEDICINE

## 2023-09-08 PROCEDURE — 99213 OFFICE O/P EST LOW 20 MIN: CPT | Mod: PBBFAC | Performed by: INTERNAL MEDICINE

## 2023-09-08 PROCEDURE — 99999 PR PBB SHADOW E&M-EST. PATIENT-LVL III: CPT | Mod: PBBFAC,,, | Performed by: INTERNAL MEDICINE

## 2023-09-08 PROCEDURE — 99214 OFFICE O/P EST MOD 30 MIN: CPT | Mod: S$PBB,,, | Performed by: INTERNAL MEDICINE

## 2023-09-08 PROCEDURE — 3008F BODY MASS INDEX DOCD: CPT | Mod: CPTII,,, | Performed by: INTERNAL MEDICINE

## 2023-09-08 PROCEDURE — 99999 PR PBB SHADOW E&M-EST. PATIENT-LVL III: ICD-10-PCS | Mod: PBBFAC,,, | Performed by: INTERNAL MEDICINE

## 2023-09-08 PROCEDURE — 3008F PR BODY MASS INDEX (BMI) DOCUMENTED: ICD-10-PCS | Mod: CPTII,,, | Performed by: INTERNAL MEDICINE

## 2023-09-08 PROCEDURE — 4010F ACE/ARB THERAPY RXD/TAKEN: CPT | Mod: CPTII,,, | Performed by: INTERNAL MEDICINE

## 2023-09-08 PROCEDURE — 1160F PR REVIEW ALL MEDS BY PRESCRIBER/CLIN PHARMACIST DOCUMENTED: ICD-10-PCS | Mod: CPTII,,, | Performed by: INTERNAL MEDICINE

## 2023-09-08 PROCEDURE — 3075F SYST BP GE 130 - 139MM HG: CPT | Mod: CPTII,,, | Performed by: INTERNAL MEDICINE

## 2023-09-08 PROCEDURE — 3075F PR MOST RECENT SYSTOLIC BLOOD PRESS GE 130-139MM HG: ICD-10-PCS | Mod: CPTII,,, | Performed by: INTERNAL MEDICINE

## 2023-09-08 PROCEDURE — 3078F DIAST BP <80 MM HG: CPT | Mod: CPTII,,, | Performed by: INTERNAL MEDICINE

## 2023-09-08 PROCEDURE — 1159F MED LIST DOCD IN RCRD: CPT | Mod: CPTII,,, | Performed by: INTERNAL MEDICINE

## 2023-09-08 RX ORDER — ROSUVASTATIN CALCIUM 40 MG/1
40 TABLET, COATED ORAL DAILY
COMMUNITY
Start: 2023-08-23 | End: 2024-02-26

## 2023-09-08 RX ORDER — OMEPRAZOLE 40 MG/1
40 CAPSULE, DELAYED RELEASE ORAL EVERY MORNING
Qty: 30 CAPSULE | Refills: 5 | Status: SHIPPED | OUTPATIENT
Start: 2023-09-08

## 2023-09-08 RX ORDER — METHOTREXATE 2.5 MG/1
10 TABLET ORAL
Qty: 20 TABLET | Refills: 5 | Status: SHIPPED | OUTPATIENT
Start: 2023-09-08

## 2023-09-08 RX ORDER — FOLIC ACID 1 MG/1
1000 TABLET ORAL DAILY
Qty: 30 TABLET | Refills: 5 | Status: SHIPPED | OUTPATIENT
Start: 2023-09-08

## 2023-09-08 RX ORDER — DULOXETIN HYDROCHLORIDE 60 MG/1
60 CAPSULE, DELAYED RELEASE ORAL EVERY MORNING
Qty: 30 CAPSULE | Refills: 5 | Status: SHIPPED | OUTPATIENT
Start: 2023-09-08

## 2023-09-08 RX ORDER — HYDROXYCHLOROQUINE SULFATE 200 MG/1
200 TABLET, FILM COATED ORAL 2 TIMES DAILY
Qty: 60 TABLET | Refills: 5 | Status: SHIPPED | OUTPATIENT
Start: 2023-09-08

## 2023-09-08 RX ORDER — ASPIRIN 325 MG
50000 TABLET, DELAYED RELEASE (ENTERIC COATED) ORAL WEEKLY
Qty: 5 CAPSULE | Refills: 5 | Status: SHIPPED | OUTPATIENT
Start: 2023-09-08

## 2023-09-08 NOTE — PROGRESS NOTES
"Subjective:       Patient ID: Virgie Light is a 58 y.o. female.    Chief Complaint: Follow-up (Follow up. Patient complains of fatigue. )    The patient is complaining of joint pain involving the MCP PIP wrist elbow shoulders hips knees and ankles bilaterally.  The pain is 2/10 in intensity dull in quality and continuous.  That is associated with a morning stiffness lasting for  less than 60 minutes.  Is also having difficulty maintaining a good night of sleep.  This has been associated with myalgias.  Muscle aches are 2/10 in intensity dull in quality and continuous.  They are associated with fatigue.  No fever no chills no others.  Labs checked during this visit         Review of Systems   Constitutional:  Negative for appetite change, chills and fever.   HENT:  Negative for congestion, ear pain, mouth sores, nosebleeds and trouble swallowing.    Eyes:  Negative for photophobia and discharge.   Respiratory:  Negative for chest tightness and shortness of breath.    Cardiovascular:  Negative for chest pain.   Gastrointestinal:  Negative for abdominal pain and vomiting.   Endocrine: Negative.    Genitourinary:  Negative for hematuria.   Musculoskeletal:         As per HPI   Skin:  Negative for rash.   Neurological:  Negative for weakness.         Objective:   /73 (BP Location: Right arm, Patient Position: Sitting, BP Method: Medium (Automatic))   Pulse 76   Temp 98.6 °F (37 °C) (Oral)   Ht 5' 3" (1.6 m)   Wt 112.3 kg (247 lb 9.6 oz)   LMP  (LMP Unknown)   SpO2 98%   BMI 43.86 kg/m²      Physical Exam   Constitutional: She is oriented to person, place, and time. She appears well-developed and well-nourished. No distress.   HENT:   Head: Normocephalic and atraumatic.   Right Ear: External ear normal.   Left Ear: External ear normal.   Eyes: Pupils are equal, round, and reactive to light.   Cardiovascular: Normal rate, regular rhythm and normal heart sounds.   Pulmonary/Chest: Breath sounds normal. "   Abdominal: Soft. There is no abdominal tenderness.   Musculoskeletal:      Right shoulder: Normal.      Left shoulder: Normal.      Right elbow: Normal.      Left elbow: Normal.      Right wrist: Normal.      Left wrist: Normal.      Cervical back: Neck supple.      Right hip: Normal.      Left hip: Normal.      Right knee: Normal.      Left knee: Normal.   Lymphadenopathy:     She has no cervical adenopathy.   Neurological: She is alert and oriented to person, place, and time. She displays normal reflexes. No cranial nerve deficit or sensory deficit. She exhibits normal muscle tone. Coordination normal.   Skin: No rash noted. No erythema.   Vitals reviewed.      Right Side Rheumatological Exam     Examination finds the shoulder, elbow, wrist, knee, 1st PIP, 1st MCP, 2nd PIP, 2nd MCP, 3rd PIP, 3rd MCP, 4th PIP, 4th MCP, 5th PIP, 5th MCP, temporomandibular, hip, ankle, 1st MTP, 2nd MTP, 3rd MTP, 4th MTP and 5th MTP normal.    Left Side Rheumatological Exam     Examination finds the shoulder, elbow, wrist, knee, 1st PIP, 1st MCP, 2nd PIP, 2nd MCP, 3rd PIP, 3rd MCP, 4th PIP, 4th MCP, 5th PIP, 5th MCP, temporomandibular, hip, ankle, 1st MTP, 2nd MTP, 3rd MTP, 4th MTP and 5th MTP normal.         Completed Fibromyalgia exam 18/18 tender points.  No data to display     Assessment:       1. Undifferentiated connective tissue disease    2. Other acute pulmonary embolism without acute cor pulmonale    3. Current use of long term anticoagulation    4. Vitamin D deficiency    5. Fibromyalgia    6. Moderate episode of recurrent major depressive disorder          Medication List with Changes/Refills   Current Medications    ADVAIR DISKUS 250-50 MCG/DOSE DISKUS INHALER    2 (two) times daily.       Start Date: 4/24/2023 End Date: --    ALBUTEROL (PROVENTIL/VENTOLIN HFA) 90 MCG/ACTUATION INHALER    2 puffs 4 (four) times daily as needed.       Start Date: 5/1/2023  End Date: --    DICLOFENAC SODIUM (SOLARAZE) 3 % GEL    APPLY 1/2  GRAM TOPICALLY TO AFFECTED AREA TWICE DAILY       Start Date: 6/8/2023  End Date: --    ELIQUIS 5 MG TAB    Take 5 mg by mouth 2 (two) times daily.       Start Date: 4/20/2023 End Date: --    FUROSEMIDE (LASIX) 20 MG TABLET    Take 20 mg by mouth once daily.       Start Date: 4/17/2023 End Date: --    LISINOPRIL 10 MG TABLET    Take 10 mg by mouth once daily.       Start Date: 4/19/2023 End Date: --    METFORMIN (GLUCOPHAGE) 500 MG TABLET    Take 500 mg by mouth daily with breakfast.       Start Date: 7/26/2023 End Date: --    ROSUVASTATIN (CRESTOR) 40 MG TAB    Take 40 mg by mouth once daily.       Start Date: 8/23/2023 End Date: --   Changed and/or Refilled Medications    Modified Medication Previous Medication    CHOLECALCIFEROL, VITAMIN D3, 1,250 MCG (50,000 UNIT) CAPSULE cholecalciferol, vitamin D3, 1,250 mcg (50,000 unit) capsule       Take 1 capsule (50,000 Units total) by mouth once a week.    Take 1 capsule (50,000 Units total) by mouth once a week.       Start Date: 9/8/2023  End Date: --    Start Date: 5/5/2023  End Date: 9/8/2023    DULOXETINE (CYMBALTA) 60 MG CAPSULE DULoxetine (CYMBALTA) 60 MG capsule       Take 1 capsule (60 mg total) by mouth every morning.    Take 1 capsule (60 mg total) by mouth every morning.       Start Date: 9/8/2023  End Date: --    Start Date: 5/5/2023  End Date: 9/8/2023    FOLIC ACID (FOLVITE) 1 MG TABLET folic acid (FOLVITE) 1 MG tablet       Take 1 tablet (1,000 mcg total) by mouth once daily.    Take 1 tablet (1,000 mcg total) by mouth once daily.       Start Date: 9/8/2023  End Date: --    Start Date: 5/5/2023  End Date: 9/8/2023    HYDROXYCHLOROQUINE (PLAQUENIL) 200 MG TABLET hydrOXYchloroQUINE (PLAQUENIL) 200 mg tablet       Take 1 tablet (200 mg total) by mouth 2 (two) times daily.    Take 1 tablet (200 mg total) by mouth 2 (two) times daily.       Start Date: 9/8/2023  End Date: --    Start Date: 5/5/2023  End Date: 9/8/2023    METHOTREXATE 2.5 MG TAB methotrexate  2.5 MG Tab       Take 4 tablets (10 mg total) by mouth every 7 days.    Take 4 tablets (10 mg total) by mouth every 7 days.       Start Date: 9/8/2023  End Date: --    Start Date: 5/5/2023  End Date: 9/8/2023    OMEPRAZOLE (PRILOSEC) 40 MG CAPSULE omeprazole (PRILOSEC) 40 MG capsule       Take 1 capsule (40 mg total) by mouth every morning.    Take 1 capsule (40 mg total) by mouth every morning.       Start Date: 9/8/2023  End Date: --    Start Date: 5/5/2023  End Date: 9/8/2023   Discontinued Medications    CLONIDINE (CATAPRES) 0.1 MG TABLET    Take 0.1 mg by mouth once. PRN       Start Date: --        End Date: 9/8/2023    ROSUVASTATIN (CRESTOR) 20 MG TABLET    Take 1 tablet by mouth every evening.       Start Date: 6/22/2022 End Date: 9/8/2023         Plan:         Problem List Items Addressed This Visit          Psychiatric    Moderate episode of recurrent major depressive disorder    Relevant Medications    cholecalciferol, vitamin D3, 1,250 mcg (50,000 unit) capsule    DULoxetine (CYMBALTA) 60 MG capsule    folic acid (FOLVITE) 1 MG tablet    hydrOXYchloroQUINE (PLAQUENIL) 200 mg tablet    methotrexate 2.5 MG Tab    omeprazole (PRILOSEC) 40 MG capsule       Immunology/Multi System    Undifferentiated connective tissue disease - Primary    Relevant Medications    cholecalciferol, vitamin D3, 1,250 mcg (50,000 unit) capsule    DULoxetine (CYMBALTA) 60 MG capsule    folic acid (FOLVITE) 1 MG tablet    hydrOXYchloroQUINE (PLAQUENIL) 200 mg tablet    methotrexate 2.5 MG Tab    omeprazole (PRILOSEC) 40 MG capsule       Hematology    Acute pulmonary embolism without acute cor pulmonale    Relevant Medications    cholecalciferol, vitamin D3, 1,250 mcg (50,000 unit) capsule    DULoxetine (CYMBALTA) 60 MG capsule    folic acid (FOLVITE) 1 MG tablet    hydrOXYchloroQUINE (PLAQUENIL) 200 mg tablet    methotrexate 2.5 MG Tab    omeprazole (PRILOSEC) 40 MG capsule    Current use of long term anticoagulation    Relevant  Medications    cholecalciferol, vitamin D3, 1,250 mcg (50,000 unit) capsule    DULoxetine (CYMBALTA) 60 MG capsule    folic acid (FOLVITE) 1 MG tablet    hydrOXYchloroQUINE (PLAQUENIL) 200 mg tablet    methotrexate 2.5 MG Tab    omeprazole (PRILOSEC) 40 MG capsule       Endocrine    Vitamin D deficiency    Relevant Medications    cholecalciferol, vitamin D3, 1,250 mcg (50,000 unit) capsule    DULoxetine (CYMBALTA) 60 MG capsule    folic acid (FOLVITE) 1 MG tablet    hydrOXYchloroQUINE (PLAQUENIL) 200 mg tablet    methotrexate 2.5 MG Tab    omeprazole (PRILOSEC) 40 MG capsule       Orthopedic    Fibromyalgia    Relevant Medications    cholecalciferol, vitamin D3, 1,250 mcg (50,000 unit) capsule    DULoxetine (CYMBALTA) 60 MG capsule    folic acid (FOLVITE) 1 MG tablet    hydrOXYchloroQUINE (PLAQUENIL) 200 mg tablet    methotrexate 2.5 MG Tab    omeprazole (PRILOSEC) 40 MG capsule

## 2023-09-12 DIAGNOSIS — Z12.31 ENCOUNTER FOR SCREENING MAMMOGRAM FOR BREAST CANCER: Primary | ICD-10-CM

## 2023-09-12 DIAGNOSIS — M79.7 FIBROMYALGIA: ICD-10-CM

## 2023-09-12 RX ORDER — DICLOFENAC SODIUM 30 MG/G
GEL TOPICAL
Qty: 100 G | Refills: 0 | Status: SHIPPED | OUTPATIENT
Start: 2023-09-12

## 2023-09-25 PROBLEM — I26.99 ACUTE PULMONARY EMBOLISM WITHOUT ACUTE COR PULMONALE: Status: RESOLVED | Noted: 2023-06-23 | Resolved: 2023-09-25

## 2023-10-05 ENCOUNTER — HOSPITAL ENCOUNTER (OUTPATIENT)
Dept: RADIOLOGY | Facility: HOSPITAL | Age: 58
Discharge: HOME | End: 2023-10-05
Attending: NURSE PRACTITIONER
Payer: MEDICAID

## 2023-10-05 VITALS — BODY MASS INDEX: 43.77 KG/M2 | HEIGHT: 63 IN | WEIGHT: 247 LBS

## 2023-10-05 DIAGNOSIS — Z12.31 ENCOUNTER FOR SCREENING MAMMOGRAM FOR BREAST CANCER: ICD-10-CM

## 2023-10-05 PROCEDURE — 77067 SCR MAMMO BI INCL CAD: CPT | Mod: TC

## 2024-02-22 NOTE — PROGRESS NOTES
Referring provider:  [facility] [place] ER consultation    Reason for consultation:  Pulmonary embolism      Diagnosis:  Unprovoked PE      HPI:    Patient is a 57-year-old with history of hypertension, hyperlipidemia, prior provoked PE who presented to the ER with symptoms of shortness of breath and chest pain for through to 3 days prior to presentation in April 2023.  She had a CT chest PE protocol done due to elevated D-dimer and was found to have multiple right-sided pulmonary embolism.  Patient was started on Eliquis in the hospital and discharged home thereafter.  Patient presented to clinic on 06/23/2023 to establish care to discuss further treatment recommendations.  She denied any history of prolonged immobilization during travel prior to her diagnosis.  She denied any direct or indirect trauma, falls.    Of note, patient had a prior history of provoked PE after her hernia repair for which she was on Eiquis for 3 years in the past.    She denies smoking, alcohol or recreational drug use.  She denies over-the-counter herbs, supplements, hormone replacement.  Patient denies any family history of DVT/PE.  He does have history of ovarian cancer in her family in her mother, prostate cancer in her father and ovarian cancer in other family member.  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    Interval history:    Today, 2/23/24, patient denies any acute concerns,, she reports tolerating Eliquis well without significant side effects.  She reports resolution of symptoms of shortness of breath and chest pain.  She denies any significant symptoms of bleeding.      Laboratory  2/23/24 CMP WNL, CBC WNL  06/23/2023 creatinine 0.75, CMP unremarkable, WBC count 4.6, hemoglobin 11.8, MCV 86.9, platelet count 222, ANC 2.5, antithrombin 3 activity 115%, beta 2 glycoprotein IgG and IgM within normal limits, anticardiolipin IgG/IgM within normal limits, factor 5 Leiden not  detected, lupus anticoagulant not detected, protein C functional 130%, protein S functional 111%, prothrombin gene mutation not detected,    Past Medical History:   Diagnosis Date    Acid reflux     Hyperlipidemia     Hypertension        Past Surgical History:   Procedure Laterality Date     SECTION      HERNIA REPAIR      OOPHORECTOMY Left 2013    OVARY SURGERY      TUBAL LIGATION         Family History   Problem Relation Age of Onset    Ovarian cancer Mother     COPD Mother     Diabetes Mellitus Mother     Hypertension Mother     Cancer Mother     Hypertension Father     Diabetes Mellitus Father     Cancer Father     Dementia Father     Ovarian cancer Sister     No Known Problems Daughter     No Known Problems Maternal Aunt     No Known Problems Maternal Uncle     No Known Problems Paternal Aunt     No Known Problems Paternal Uncle     No Known Problems Maternal Grandmother     No Known Problems Maternal Grandfather     No Known Problems Paternal Grandmother     No Known Problems Paternal Grandfather     No Known Problems Other     BRCA 1/2 Neg Hx        Social History     Socioeconomic History    Marital status: Single   Tobacco Use    Smoking status: Never     Passive exposure: Never    Smokeless tobacco: Never   Substance and Sexual Activity    Alcohol use: Never    Drug use: Never    Sexual activity: Not Currently       Current Outpatient Medications   Medication Sig Dispense Refill    ADVAIR DISKUS 250-50 mcg/dose diskus inhaler 2 (two) times daily.      albuterol (PROVENTIL/VENTOLIN HFA) 90 mcg/actuation inhaler 2 puffs 4 (four) times daily as needed.      ELIQUIS 5 mg Tab Take 5 mg by mouth 2 (two) times daily.      furosemide (LASIX) 20 MG tablet Take 20 mg by mouth once daily.      lisinopriL 10 MG tablet Take 10 mg by mouth once daily.      metFORMIN (GLUCOPHAGE) 500 MG tablet Take 500 mg by mouth daily with breakfast.      omeprazole (PRILOSEC) 40 MG capsule Take 1 capsule (40 mg total) by mouth  every morning. 30 capsule 5    rosuvastatin (CRESTOR) 40 MG Tab Take 40 mg by mouth once daily.      cholecalciferol, vitamin D3, 1,250 mcg (50,000 unit) capsule Take 1 capsule (50,000 Units total) by mouth once a week. (Patient not taking: Reported on 2/23/2024) 5 capsule 5    diclofenac sodium (SOLARAZE) 3 % gel APPLY 1/2 GRAM TOPICALLY TO AFFECTED AREA TWICE DAILY (Patient not taking: Reported on 2/23/2024) 100 g 0    DULoxetine (CYMBALTA) 60 MG capsule Take 1 capsule (60 mg total) by mouth every morning. (Patient not taking: Reported on 2/23/2024) 30 capsule 5    folic acid (FOLVITE) 1 MG tablet Take 1 tablet (1,000 mcg total) by mouth once daily. (Patient not taking: Reported on 2/23/2024) 30 tablet 5    hydrOXYchloroQUINE (PLAQUENIL) 200 mg tablet Take 1 tablet (200 mg total) by mouth 2 (two) times daily. (Patient not taking: Reported on 2/23/2024) 60 tablet 5    methotrexate 2.5 MG Tab Take 4 tablets (10 mg total) by mouth every 7 days. (Patient not taking: Reported on 2/23/2024) 20 tablet 5     No current facility-administered medications for this visit.       Review of patient's allergies indicates:   Allergen Reactions    Opioids - morphine analogues Other (See Comments)     Makes her feel hot and bad    Shrimp Itching    Theophenyllin Palpitations         Review of systems  CONSTITUTIONAL: no fevers, no chills, no weight loss, no fatigue, no weakness  HEMATOLOGIC: no abnormal bleeding, no abnormal bruising, no drenching night sweats  ONCOLOGIC: no new masses or lumps  HEENT: no vision loss, no tinnitus or hearing loss, no nose bleeding, no dysphagia, no odynophagia  CVS: no chest pain, no palpitations, no dyspnea on exertion  RESP: no shortness of breath, no hemoptysis, no cough  GI: no nausea, no vomiting, no diarrhea, no constipation, no melena, no hematochezia, no hematemesis, no abdominal pain, no increase in abdominal girth  : no dysuria, no hematuria, no hesitancy, no scrotal swelling, no  discharge  INTEGUMENT: no rashes, no abnormal bruising, no nail pitting, no hyperpigmentation  NEURO: no falls, no memory loss, no paresthesias or dysesthesias, no urofecal incontinence or retention, no loss of strength on any extremity  MSK: no back pain, no new joint pain, no joint swelling  PSYCH: no suicidal or homicidal ideation, no depression, no insomnia, no anhedonia  ENDOCRINE: no heat or cold intolerance, no polyuria, no polydipsia      Physical Exam:  Vitals:    02/23/24 0945   BP: (!) 150/84   Pulse: 84   Resp: 20   Temp: 98.3 °F (36.8 °C)         ECOG PS 0  GA: AAOx3, NAD  HEENT: NCAT, PERRLA, EOMI, good dentition, moist oral mucous membranes  LYMPH: no cervical, axillary or supraclavicular adenopathy  CVS: s1s2 RRR, no M/R/G  RESP: CTA b/l, no crackles, no wheezes or rhonchi  ABD: soft, NT, ND, BS+, no hepatosplenomegaly  EXT: no deformities, no pedal edema  SKIN: no rashes, warm and dry  NEURO: normal mentation, strength 5/5 on all 4 extremities, no sensory deficits        Assessment    # Unprovoked PE  Patient was diagnosed with unprovoked PE in April 2023 after she presented with symptoms of shortness chest pain   She had a prior diagnosis of provoked PE after surgery in 2014 for which she was on anticoagulation up until 2018   Patient denies any family history of DVT/PE   Patient was obese however does not have any other risk factors.  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  Discussed the diagnosis, treatment recommendations with Eliquis, likely indefinitely given the unprovoked nature of her most recent PE.      Plan   2/23/24  Continue Eliquis 5 mg b.i.d.   Follow-up in clinic in 6 months with repeat labs.    Erica ALVARADO

## 2024-02-23 ENCOUNTER — OFFICE VISIT (OUTPATIENT)
Dept: HEMATOLOGY/ONCOLOGY | Facility: CLINIC | Age: 59
End: 2024-02-23
Payer: COMMERCIAL

## 2024-02-23 VITALS
HEIGHT: 63 IN | SYSTOLIC BLOOD PRESSURE: 150 MMHG | DIASTOLIC BLOOD PRESSURE: 84 MMHG | BODY MASS INDEX: 42.65 KG/M2 | RESPIRATION RATE: 20 BRPM | TEMPERATURE: 98 F | HEART RATE: 84 BPM | WEIGHT: 240.69 LBS | OXYGEN SATURATION: 98 %

## 2024-02-23 DIAGNOSIS — Z79.01 CURRENT USE OF LONG TERM ANTICOAGULATION: Primary | ICD-10-CM

## 2024-02-23 DIAGNOSIS — I26.99 PULMONARY EMBOLISM, UNSPECIFIED CHRONICITY, UNSPECIFIED PULMONARY EMBOLISM TYPE, UNSPECIFIED WHETHER ACUTE COR PULMONALE PRESENT: ICD-10-CM

## 2024-02-23 DIAGNOSIS — E79.0 HYPERURICEMIA: Primary | ICD-10-CM

## 2024-02-23 PROCEDURE — 1159F MED LIST DOCD IN RCRD: CPT | Mod: CPTII,,,

## 2024-02-23 PROCEDURE — 3079F DIAST BP 80-89 MM HG: CPT | Mod: CPTII,,,

## 2024-02-23 PROCEDURE — 1160F RVW MEDS BY RX/DR IN RCRD: CPT | Mod: CPTII,,,

## 2024-02-23 PROCEDURE — 99214 OFFICE O/P EST MOD 30 MIN: CPT | Mod: ,,,

## 2024-02-23 PROCEDURE — 3008F BODY MASS INDEX DOCD: CPT | Mod: CPTII,,,

## 2024-02-23 PROCEDURE — 3077F SYST BP >= 140 MM HG: CPT | Mod: CPTII,,,

## 2024-02-26 ENCOUNTER — OFFICE VISIT (OUTPATIENT)
Dept: FAMILY MEDICINE | Facility: CLINIC | Age: 59
End: 2024-02-26
Payer: COMMERCIAL

## 2024-02-26 VITALS
DIASTOLIC BLOOD PRESSURE: 70 MMHG | SYSTOLIC BLOOD PRESSURE: 106 MMHG | RESPIRATION RATE: 17 BRPM | OXYGEN SATURATION: 97 % | HEIGHT: 63 IN | WEIGHT: 242.81 LBS | BODY MASS INDEX: 43.02 KG/M2 | HEART RATE: 81 BPM | TEMPERATURE: 98 F

## 2024-02-26 DIAGNOSIS — F33.1 MODERATE EPISODE OF RECURRENT MAJOR DEPRESSIVE DISORDER: ICD-10-CM

## 2024-02-26 DIAGNOSIS — Z11.4 ENCOUNTER FOR SCREENING FOR HIV: ICD-10-CM

## 2024-02-26 DIAGNOSIS — M35.9 UNDIFFERENTIATED CONNECTIVE TISSUE DISEASE: ICD-10-CM

## 2024-02-26 DIAGNOSIS — I50.9 CONGESTIVE HEART FAILURE, UNSPECIFIED HF CHRONICITY, UNSPECIFIED HEART FAILURE TYPE: ICD-10-CM

## 2024-02-26 DIAGNOSIS — M79.7 FIBROMYALGIA: ICD-10-CM

## 2024-02-26 DIAGNOSIS — Z76.89 ENCOUNTER TO ESTABLISH CARE: ICD-10-CM

## 2024-02-26 DIAGNOSIS — J45.40 MODERATE PERSISTENT ASTHMA, UNCOMPLICATED: ICD-10-CM

## 2024-02-26 DIAGNOSIS — E78.2 MIXED HYPERLIPIDEMIA: ICD-10-CM

## 2024-02-26 DIAGNOSIS — Z11.59 ENCOUNTER FOR HEPATITIS C SCREENING TEST FOR LOW RISK PATIENT: ICD-10-CM

## 2024-02-26 DIAGNOSIS — Z79.01 CURRENT USE OF LONG TERM ANTICOAGULATION: ICD-10-CM

## 2024-02-26 DIAGNOSIS — I48.11 LONGSTANDING PERSISTENT ATRIAL FIBRILLATION: ICD-10-CM

## 2024-02-26 DIAGNOSIS — Z00.00 WELLNESS EXAMINATION: ICD-10-CM

## 2024-02-26 DIAGNOSIS — I26.99 PULMONARY EMBOLISM, UNSPECIFIED CHRONICITY, UNSPECIFIED PULMONARY EMBOLISM TYPE, UNSPECIFIED WHETHER ACUTE COR PULMONALE PRESENT: ICD-10-CM

## 2024-02-26 DIAGNOSIS — I10 PRIMARY HYPERTENSION: ICD-10-CM

## 2024-02-26 DIAGNOSIS — E11.65 TYPE 2 DIABETES MELLITUS WITH HYPERGLYCEMIA, WITHOUT LONG-TERM CURRENT USE OF INSULIN: ICD-10-CM

## 2024-02-26 PROBLEM — Z86.711 PERSONAL HISTORY OF PULMONARY EMBOLISM: Status: ACTIVE | Noted: 2024-02-26

## 2024-02-26 PROBLEM — E11.9 DIABETES MELLITUS: Status: ACTIVE | Noted: 2024-02-26

## 2024-02-26 PROBLEM — I48.91 UNSPECIFIED ATRIAL FIBRILLATION: Status: ACTIVE | Noted: 2024-02-26

## 2024-02-26 PROBLEM — Z87.11 HISTORY OF PEPTIC ULCER DISEASE: Status: ACTIVE | Noted: 2024-02-26

## 2024-02-26 PROCEDURE — 1159F MED LIST DOCD IN RCRD: CPT | Mod: CPTII,,, | Performed by: STUDENT IN AN ORGANIZED HEALTH CARE EDUCATION/TRAINING PROGRAM

## 2024-02-26 PROCEDURE — 1160F RVW MEDS BY RX/DR IN RCRD: CPT | Mod: CPTII,,, | Performed by: STUDENT IN AN ORGANIZED HEALTH CARE EDUCATION/TRAINING PROGRAM

## 2024-02-26 PROCEDURE — 99204 OFFICE O/P NEW MOD 45 MIN: CPT | Mod: ,,, | Performed by: STUDENT IN AN ORGANIZED HEALTH CARE EDUCATION/TRAINING PROGRAM

## 2024-02-26 PROCEDURE — 3008F BODY MASS INDEX DOCD: CPT | Mod: CPTII,,, | Performed by: STUDENT IN AN ORGANIZED HEALTH CARE EDUCATION/TRAINING PROGRAM

## 2024-02-26 PROCEDURE — 3074F SYST BP LT 130 MM HG: CPT | Mod: CPTII,,, | Performed by: STUDENT IN AN ORGANIZED HEALTH CARE EDUCATION/TRAINING PROGRAM

## 2024-02-26 PROCEDURE — 3078F DIAST BP <80 MM HG: CPT | Mod: CPTII,,, | Performed by: STUDENT IN AN ORGANIZED HEALTH CARE EDUCATION/TRAINING PROGRAM

## 2024-02-26 RX ORDER — ATORVASTATIN CALCIUM 20 MG/1
20 TABLET, FILM COATED ORAL DAILY
Qty: 90 TABLET | Refills: 3 | Status: SHIPPED | OUTPATIENT
Start: 2024-02-26 | End: 2024-05-13 | Stop reason: SDUPTHER

## 2024-02-26 NOTE — PROGRESS NOTES
Patient ID: [id number]     Chief Complaint: Establish Care    HPI:      Disclaimer:  This note is prepared using voice recognition software and as such is likely to have errors despite attempts at proofreading. Please contact me for questions.     Virgie Light is a 58 y.o. female here today for the following    Acute Issues-    Chronic Issues-  Diabetes mellitus  A1C is pending  Is on Metformin  Mother has a history of pancreatic cancer    Acid reflux-  On Prilosec  Asx    Current use of long term anticoagulation  Personal history of pulmonary embolism  CHF-  On fursemide, lisinopril  On Elliquis x BID  Sees Hem/ onc    Unspecified atrial fibrillation   Sees Dr. Billingsley at [facility]    Hypertension  On Lisinopril    Hyperlipidemia  On Crestor  Having leg cramps   Would like to switch statins    Moderate episode of recurrent major depressive disorder  On Cymbalta  PHQ9- 0    Moderate persistent asthma, uncomplicated  Well controlled  On Albuterol and Advair    Undifferentiated connective tissue disease  Dced Methotrexate, Plaquanil, Duloxetine      Past Surgical History:   Procedure Laterality Date     SECTION      HERNIA REPAIR      OOPHORECTOMY Left 2013    OVARY SURGERY      TUBAL LIGATION         Review of patient's allergies indicates:   Allergen Reactions    Opioids - morphine analogues Other (See Comments)     Makes her feel hot and bad    Shrimp Itching    Theophenyllin Palpitations       Current Outpatient Medications   Medication Instructions    ADVAIR DISKUS 250-50 mcg/dose diskus inhaler 2 times daily    albuterol (PROVENTIL/VENTOLIN HFA) 90 mcg/actuation inhaler 2 puffs, 4 times daily PRN    cholecalciferol (vitamin D3) 50,000 Units, Oral, Weekly    diclofenac sodium (SOLARAZE) 3 % gel APPLY 1/2 GRAM TOPICALLY TO AFFECTED AREA TWICE DAILY    DULoxetine (CYMBALTA) 60 mg, Oral, Every morning    ELIQUIS 5 mg, Oral, 2 times daily    folic acid (FOLVITE) 1,000 mcg, Oral, Daily    furosemide (LASIX)  20 mg, Oral, Daily    hydroxychloroquine (PLAQUENIL) 200 mg, Oral, 2 times daily    lisinopriL 10 mg, Oral, Daily    metFORMIN (GLUCOPHAGE) 500 mg, Oral, With breakfast    methotrexate 10 mg, Oral, Every 7 days    omeprazole (PRILOSEC) 40 mg, Oral, Every morning    rosuvastatin (CRESTOR) 40 mg, Oral, Daily       Social History     Socioeconomic History    Marital status: Single   Tobacco Use    Smoking status: Never     Passive exposure: Never    Smokeless tobacco: Never   Substance and Sexual Activity    Alcohol use: Never    Drug use: Never    Sexual activity: Not Currently        Family History   Problem Relation Age of Onset    Ovarian cancer Mother     COPD Mother     Diabetes Mellitus Mother     Hypertension Mother     Cancer Mother     Hypertension Father     Diabetes Mellitus Father     Cancer Father     Dementia Father     Ovarian cancer Sister     No Known Problems Daughter     No Known Problems Maternal Aunt     No Known Problems Maternal Uncle     No Known Problems Paternal Aunt     No Known Problems Paternal Uncle     No Known Problems Maternal Grandmother     No Known Problems Maternal Grandfather     No Known Problems Paternal Grandmother     No Known Problems Paternal Grandfather     No Known Problems Other     BRCA 1/2 Neg Hx         Patient Care Team:  Chelo Mijares MD as PCP - General (Family Medicine)     Subjective:     ROS    See HPI for details    Constitutional: Denies Change in appetite. Denies Chills. Denies Fever. Denies Night sweats.  Respiratory: Denies Cough. Denies Shortness of breath. Denies Shortness of breath with exertion. Denies Wheezing.  Cardiovascular: DeniesChest pain at rest. Denies Chest pain with exertion. Denies Irregular heartbeat. Denies Palpitations. Denies Edema.  Gastrointestinal: Denies Abdominal pain. DeniesDiarrhea. Denies Nausea. Denies Vomiting. Denies Hematemesis or Hematochezia.  Genitourinary: Denies Dysuria. Denies Urinary frequency. Denies Urinary  urgency. Denies Blood in urine.  Endocrine: Denies Cold intolerance. Denies Excessive thirst. Denies Heat intolerance. Denies Weight loss. Denies Weight gain.  Musculoskeletal: Denies Painful joints. Denies Weakness.  Integumentary: Denies Rash. Denies Itching. Denies Dry skin.  Neurologic: Denies Dizziness. Denies Fainting. Denies Headache.  Psychiatric: Denies Depression. Denies Anxiety. Denies Suicidal/Homicidal ideations.    All Other ROS: Negative except as stated in HPI.  Objective:     Visit Vitals  LMP  (LMP Unknown)       Physical Exam    General: Alert and oriented, No acute distress.  Respiratory: Clear to auscultation bilaterally; No wheezes, rales or rhonchi,Non-labored respirations, Symmetrical chest wall expansion.  Cardiovascular: Regular rate and rhythm, S1/S2 normal, No murmurs, rubs or gallops.  Gastrointestinal: Soft, Non-tender, Non-distended, Normal bowel sounds, No palpable organomegaly.  Musculoskeletal: Normal range of motion.  Extremities: No clubbing, cyanosis or edema  Neurologic: No focal deficits  Psychiatric: Normal interaction, Coherent speech, Euthymic mood, Appropriate affect   Assessment:       ICD-10-CM ICD-9-CM   1. Encounter to establish care  Z76.89 V65.8   2. Mixed hyperlipidemia  E78.2 272.2   3. Moderate episode of recurrent major depressive disorder  F33.1 296.32   4. Current use of long term anticoagulation  Z79.01 V58.61   5. Pulmonary embolism, unspecified chronicity, unspecified pulmonary embolism type, unspecified whether acute cor pulmonale present  I26.99 415.19   6. Fibromyalgia  M79.7 729.1   7. Moderate persistent asthma, uncomplicated  J45.40 493.90   8. Primary hypertension  I10 401.9   9. Undifferentiated connective tissue disease  M35.9 710.9   10. Longstanding persistent atrial fibrillation  I48.11 427.31   11. Type 2 diabetes mellitus with hyperglycemia, without long-term current use of insulin  E11.65 250.00     790.29   12. Encounter for screening for HIV   Z11.4 V73.89   13. Encounter for hepatitis C screening test for low risk patient  Z11.59 V73.89   14. Wellness examination  Z00.00 V70.0   15. Congestive heart failure, unspecified HF chronicity, unspecified heart failure type  I50.9 428.0        Plan:     1. Encounter to establish care  Discuss about the blood work/screening test/immunizations   Recommend to start dash diet along with 35 minutes of brisk walking    2. Mixed hyperlipidemia  Discontinue Crestor as the patient can not tolerate it   Start atorvastatin as prescribed  -     atorvastatin (LIPITOR) 20 MG tablet; Take 1 tablet (20 mg total) by mouth once daily.  Dispense: 90 tablet; Refill: 3    3. Moderate episode of recurrent major depressive disorder  PHQ-9 score today is 0  Continue Cymbalta as prescribed    4. Current use of long term anticoagulation  5. Pulmonary embolism, unspecified chronicity, unspecified pulmonary embolism type, unspecified whether acute cor pulmonale present  Keep scheduled follow up with Hematology   Continue Eliquis as prescribed  ER precautions provided regarding chest pain/shortness a breath/dizziness    6. Fibromyalgia  7. Undifferentiated connective tissue disease  Patient lost follow up with Rheumatology   Declined for the referral today  Continue to monitor    8. Moderate persistent asthma, uncomplicated  Well controlled  Continue inhalers as prescribed    9. Primary hypertension  10. Longstanding persistent atrial fibrillation  11. Congestive heart failure, unspecified HF chronicity, unspecified heart failure type  Keep goal blood pressure less than 130/80  Continue Eliquis as prescribed   Continue Rx as prescribed  Keep scheduled follow up with the Cardiology  Follow low-sodium diet    12. Type 2 diabetes mellitus with hyperglycemia, without long-term current use of insulin  Continue metformin as prescribed   A1c pending   Continue 35 minutes of brisk walking and Mediterranean diet    13. Encounter for screening for  HIV  -     HIV 1/2 Ag/Ab (4th Gen); Future; Expected date: 02/26/2024    14. Encounter for hepatitis C screening test for low risk patient  -     Hepatitis C Antibody; Future; Expected date: 02/26/2024    15. Wellness examination  -     Lipid Panel; Future; Expected date: 02/26/2024  -     TSH; Future; Expected date: 02/26/2024  -     Hemoglobin A1C; Future; Expected date: 02/26/2024  -     Urinalysis; Future; Expected date: 02/26/2024  -     Microalbumin/Creatinine Ratio, Urine; Future; Expected date: 02/26/2024  -     Uric Acid; Future; Expected date: 02/26/2024  -     Vitamin D; Future; Expected date: 02/26/2024           Medication List with Changes/Refills   Current Medications    ADVAIR DISKUS 250-50 MCG/DOSE DISKUS INHALER    2 (two) times daily.       Start Date: 4/24/2023 End Date: --    ALBUTEROL (PROVENTIL/VENTOLIN HFA) 90 MCG/ACTUATION INHALER    2 puffs 4 (four) times daily as needed.       Start Date: 5/1/2023  End Date: --    CHOLECALCIFEROL, VITAMIN D3, 1,250 MCG (50,000 UNIT) CAPSULE    Take 1 capsule (50,000 Units total) by mouth once a week.       Start Date: 9/8/2023  End Date: --    DICLOFENAC SODIUM (SOLARAZE) 3 % GEL    APPLY 1/2 GRAM TOPICALLY TO AFFECTED AREA TWICE DAILY       Start Date: 9/12/2023 End Date: --    DULOXETINE (CYMBALTA) 60 MG CAPSULE    Take 1 capsule (60 mg total) by mouth every morning.       Start Date: 9/8/2023  End Date: --    ELIQUIS 5 MG TAB    Take 5 mg by mouth 2 (two) times daily.       Start Date: 4/20/2023 End Date: --    FOLIC ACID (FOLVITE) 1 MG TABLET    Take 1 tablet (1,000 mcg total) by mouth once daily.       Start Date: 9/8/2023  End Date: --    FUROSEMIDE (LASIX) 20 MG TABLET    Take 20 mg by mouth once daily.       Start Date: 4/17/2023 End Date: --    HYDROXYCHLOROQUINE (PLAQUENIL) 200 MG TABLET    Take 1 tablet (200 mg total) by mouth 2 (two) times daily.       Start Date: 9/8/2023  End Date: --    LISINOPRIL 10 MG TABLET    Take 10 mg by mouth once  daily.       Start Date: 4/19/2023 End Date: --    METFORMIN (GLUCOPHAGE) 500 MG TABLET    Take 500 mg by mouth daily with breakfast.       Start Date: 7/26/2023 End Date: --    METHOTREXATE 2.5 MG TAB    Take 4 tablets (10 mg total) by mouth every 7 days.       Start Date: 9/8/2023  End Date: --    OMEPRAZOLE (PRILOSEC) 40 MG CAPSULE    Take 1 capsule (40 mg total) by mouth every morning.       Start Date: 9/8/2023  End Date: --    ROSUVASTATIN (CRESTOR) 40 MG TAB    Take 40 mg by mouth once daily.       Start Date: 8/23/2023 End Date: --          Follow up for Annual Wellness.   In addition to their scheduled follow up, the patient has also been instructed to follow up on as needed basis.

## 2024-02-26 NOTE — LETTER
AUTHORIZATION FOR RELEASE OF   CONFIDENTIAL INFORMATION    Dear Dr. Saab,    We are seeing Virgie Light, date of birth 1965, in the clinic at [facility]. Chelo Mijares MD is the patient's PCP. Virgie Light has an outstanding lab/procedure at the time we reviewed her chart. In order to help keep her health information updated, she has authorized us to request the following medical record(s):        (  )  MAMMOGRAM                                      ( X )  COLONOSCOPY      (  )  PAP SMEAR                                          (  )  OUTSIDE LAB RESULTS     (  )  DEXA SCAN                                          (  )  EYE EXAM            (  )  FOOT EXAM                                          (  )  ENTIRE RECORD     (  )  OUTSIDE IMMUNIZATIONS                 (  )  _______________         Please fax records to [facility], Choudhary, Shalini, MD, [phone]     If you have any questions, please contact Office at  [phone].           Patient Name: Virgie Light  : 1965  Patient Phone #: [phone]

## 2024-03-13 ENCOUNTER — LAB VISIT (OUTPATIENT)
Dept: LAB | Facility: HOSPITAL | Age: 59
End: 2024-03-13
Attending: STUDENT IN AN ORGANIZED HEALTH CARE EDUCATION/TRAINING PROGRAM
Payer: COMMERCIAL

## 2024-03-13 DIAGNOSIS — Z00.00 WELLNESS EXAMINATION: ICD-10-CM

## 2024-03-13 DIAGNOSIS — Z11.59 ENCOUNTER FOR HEPATITIS C SCREENING TEST FOR LOW RISK PATIENT: ICD-10-CM

## 2024-03-13 DIAGNOSIS — Z11.4 ENCOUNTER FOR SCREENING FOR HIV: ICD-10-CM

## 2024-03-13 LAB
CHOLEST SERPL-MCNC: 199 MG/DL
CHOLEST/HDLC SERPL: 4 {RATIO} (ref 0–5)
CREAT UR-MCNC: 85.1 MG/DL (ref 45–106)
DEPRECATED CALCIDIOL+CALCIFEROL SERPL-MC: 18.4 NG/ML (ref 30–80)
EST. AVERAGE GLUCOSE BLD GHB EST-MCNC: 145.6 MG/DL
HBA1C MFR BLD: 6.7 %
HCV AB SERPL QL IA: NONREACTIVE
HDLC SERPL-MCNC: 48 MG/DL (ref 35–60)
HIV 1+2 AB+HIV1 P24 AG SERPL QL IA: NONREACTIVE
LDLC SERPL CALC-MCNC: 136 MG/DL (ref 50–140)
MICROALBUMIN UR-MCNC: <5 UG/ML
MICROALBUMIN/CREAT RATIO PNL UR: NORMAL
TRIGL SERPL-MCNC: 73 MG/DL (ref 37–140)
TSH SERPL-ACNC: 2.88 UIU/ML (ref 0.35–4.94)
URATE SERPL-MCNC: 6.1 MG/DL (ref 2.6–6)
VLDLC SERPL CALC-MCNC: 15 MG/DL

## 2024-03-13 PROCEDURE — 87389 HIV-1 AG W/HIV-1&-2 AB AG IA: CPT

## 2024-03-13 PROCEDURE — 80061 LIPID PANEL: CPT

## 2024-03-13 PROCEDURE — 36415 COLL VENOUS BLD VENIPUNCTURE: CPT

## 2024-03-13 PROCEDURE — 84550 ASSAY OF BLOOD/URIC ACID: CPT

## 2024-03-13 PROCEDURE — 86803 HEPATITIS C AB TEST: CPT

## 2024-03-13 PROCEDURE — 84443 ASSAY THYROID STIM HORMONE: CPT

## 2024-03-13 PROCEDURE — 83036 HEMOGLOBIN GLYCOSYLATED A1C: CPT

## 2024-03-13 PROCEDURE — 82043 UR ALBUMIN QUANTITATIVE: CPT

## 2024-03-13 PROCEDURE — 82306 VITAMIN D 25 HYDROXY: CPT

## 2024-03-18 ENCOUNTER — PATIENT MESSAGE (OUTPATIENT)
Dept: ADMINISTRATIVE | Facility: HOSPITAL | Age: 59
End: 2024-03-18
Payer: COMMERCIAL

## 2024-03-18 RX ORDER — ALLOPURINOL 100 MG/1
100 TABLET ORAL 2 TIMES DAILY
Qty: 60 TABLET | Refills: 2 | Status: SHIPPED | OUTPATIENT
Start: 2024-03-18 | End: 2024-05-13 | Stop reason: SDUPTHER

## 2024-03-18 NOTE — PROGRESS NOTES
Please inform patient of lab results.    1. Low vitamin-D  Increase outdoor physical activity   Start 35 minutes of brisk walking outdoor   Start over-the-counter vitamin-D supplementations 1000 IU daily    2. High uric acid  Avoid red meat/alcohol if taking  Start allopurinol as prescribed    3. Other labwork within acceptable ranges.    Thanks,    Dr. Mijares

## 2024-03-19 ENCOUNTER — PATIENT MESSAGE (OUTPATIENT)
Dept: FAMILY MEDICINE | Facility: CLINIC | Age: 59
End: 2024-03-19
Payer: COMMERCIAL

## 2024-03-20 ENCOUNTER — PATIENT OUTREACH (OUTPATIENT)
Dept: ADMINISTRATIVE | Facility: HOSPITAL | Age: 59
End: 2024-03-20
Payer: COMMERCIAL

## 2024-03-20 NOTE — PROGRESS NOTES
Health Maintenance Topic(s) Outreach Outcomes & Actions Taken:    Cervical Cancer Screening - Outreach Outcomes & Actions Taken  : External Records Requested & Care Team Updated if Applicable         Additional Notes:  Request Records: [facility] in [place] [facility] Pap smear

## 2024-03-20 NOTE — LETTER
AUTHORIZATION FOR RELEASE OF   CONFIDENTIAL INFORMATION    Dear [facility],   Fax: [phone]    We are seeing Virgie Light, date of birth 1965, in the clinic at [facility]. Chelo Mijares MD is the patient's PCP. Virgie Light has an outstanding lab/procedure at the time we reviewed her chart. In order to help keep her health information updated, she has authorized us to request the following medical record(s):        (  )  MAMMOGRAM                                      (  )  COLONOSCOPY      ( X )  PAP SMEAR                                          (  )  OUTSIDE LAB RESULTS     (  )  DEXA SCAN                                          (  )  EYE EXAM            (  )  FOOT EXAM                                          (  )  ENTIRE RECORD     (  )  OUTSIDE IMMUNIZATIONS                 (  )  _______________         Please fax records to [facility], Choudhary, Shalini, MD, [phone]     If you have any question or concerns. Please call [facility] @ [phone]          Patient Name: Virgie Light  : 1965  Patient Phone #: [phone]

## 2024-04-01 DIAGNOSIS — I10 PRIMARY HYPERTENSION: Primary | ICD-10-CM

## 2024-04-01 RX ORDER — LISINOPRIL 10 MG/1
10 TABLET ORAL DAILY
Qty: 90 TABLET | Refills: 3 | Status: SHIPPED | OUTPATIENT
Start: 2024-04-01

## 2024-04-12 DIAGNOSIS — I10 PRIMARY HYPERTENSION: Primary | ICD-10-CM

## 2024-04-12 RX ORDER — FUROSEMIDE 20 MG/1
20 TABLET ORAL DAILY
Qty: 90 TABLET | Refills: 0 | Status: SHIPPED | OUTPATIENT
Start: 2024-04-12

## 2024-04-30 DIAGNOSIS — E11.65 TYPE 2 DIABETES MELLITUS WITH HYPERGLYCEMIA, WITHOUT LONG-TERM CURRENT USE OF INSULIN: Primary | ICD-10-CM

## 2024-04-30 RX ORDER — INSULIN PUMP SYRINGE, 3 ML
1 EACH MISCELLANEOUS 2 TIMES DAILY
Qty: 1 EACH | Refills: 0 | Status: SHIPPED | OUTPATIENT
Start: 2024-04-30

## 2024-04-30 RX ORDER — INSULIN PUMP SYRINGE, 3 ML
1 EACH MISCELLANEOUS 2 TIMES DAILY
COMMUNITY
End: 2024-04-30 | Stop reason: SDUPTHER

## 2024-05-13 ENCOUNTER — OFFICE VISIT (OUTPATIENT)
Dept: FAMILY MEDICINE | Facility: CLINIC | Age: 59
End: 2024-05-13
Payer: COMMERCIAL

## 2024-05-13 ENCOUNTER — TELEPHONE (OUTPATIENT)
Dept: FAMILY MEDICINE | Facility: CLINIC | Age: 59
End: 2024-05-13

## 2024-05-13 VITALS
BODY MASS INDEX: 45.69 KG/M2 | HEIGHT: 61 IN | RESPIRATION RATE: 20 BRPM | DIASTOLIC BLOOD PRESSURE: 65 MMHG | WEIGHT: 242 LBS | SYSTOLIC BLOOD PRESSURE: 118 MMHG | HEART RATE: 80 BPM | TEMPERATURE: 98 F | OXYGEN SATURATION: 98 %

## 2024-05-13 DIAGNOSIS — M79.7 FIBROMYALGIA: ICD-10-CM

## 2024-05-13 DIAGNOSIS — E55.9 VITAMIN D DEFICIENCY: ICD-10-CM

## 2024-05-13 DIAGNOSIS — Z12.31 BREAST CANCER SCREENING BY MAMMOGRAM: ICD-10-CM

## 2024-05-13 DIAGNOSIS — I48.11 LONGSTANDING PERSISTENT ATRIAL FIBRILLATION: ICD-10-CM

## 2024-05-13 DIAGNOSIS — E78.2 MIXED HYPERLIPIDEMIA: ICD-10-CM

## 2024-05-13 DIAGNOSIS — E11.65 TYPE 2 DIABETES MELLITUS WITH HYPERGLYCEMIA, WITHOUT LONG-TERM CURRENT USE OF INSULIN: ICD-10-CM

## 2024-05-13 DIAGNOSIS — Z00.00 WELL ADULT HEALTH CHECK: ICD-10-CM

## 2024-05-13 DIAGNOSIS — E79.0 HYPERURICEMIA: ICD-10-CM

## 2024-05-13 DIAGNOSIS — Z87.11 HISTORY OF PEPTIC ULCER DISEASE: ICD-10-CM

## 2024-05-13 DIAGNOSIS — Z79.01 CURRENT USE OF LONG TERM ANTICOAGULATION: ICD-10-CM

## 2024-05-13 DIAGNOSIS — Z86.711 PERSONAL HISTORY OF PULMONARY EMBOLISM: ICD-10-CM

## 2024-05-13 DIAGNOSIS — Z00.00 WELLNESS EXAMINATION: ICD-10-CM

## 2024-05-13 DIAGNOSIS — J45.40 MODERATE PERSISTENT ASTHMA, UNCOMPLICATED: ICD-10-CM

## 2024-05-13 DIAGNOSIS — I10 PRIMARY HYPERTENSION: ICD-10-CM

## 2024-05-13 DIAGNOSIS — F33.0 MILD EPISODE OF RECURRENT MAJOR DEPRESSIVE DISORDER: ICD-10-CM

## 2024-05-13 PROCEDURE — 4010F ACE/ARB THERAPY RXD/TAKEN: CPT | Mod: CPTII,,, | Performed by: STUDENT IN AN ORGANIZED HEALTH CARE EDUCATION/TRAINING PROGRAM

## 2024-05-13 PROCEDURE — 1160F RVW MEDS BY RX/DR IN RCRD: CPT | Mod: CPTII,,, | Performed by: STUDENT IN AN ORGANIZED HEALTH CARE EDUCATION/TRAINING PROGRAM

## 2024-05-13 PROCEDURE — 3074F SYST BP LT 130 MM HG: CPT | Mod: CPTII,,, | Performed by: STUDENT IN AN ORGANIZED HEALTH CARE EDUCATION/TRAINING PROGRAM

## 2024-05-13 PROCEDURE — 99396 PREV VISIT EST AGE 40-64: CPT | Mod: ,,, | Performed by: STUDENT IN AN ORGANIZED HEALTH CARE EDUCATION/TRAINING PROGRAM

## 2024-05-13 PROCEDURE — 3061F NEG MICROALBUMINURIA REV: CPT | Mod: CPTII,,, | Performed by: STUDENT IN AN ORGANIZED HEALTH CARE EDUCATION/TRAINING PROGRAM

## 2024-05-13 PROCEDURE — 3044F HG A1C LEVEL LT 7.0%: CPT | Mod: CPTII,,, | Performed by: STUDENT IN AN ORGANIZED HEALTH CARE EDUCATION/TRAINING PROGRAM

## 2024-05-13 PROCEDURE — 3078F DIAST BP <80 MM HG: CPT | Mod: CPTII,,, | Performed by: STUDENT IN AN ORGANIZED HEALTH CARE EDUCATION/TRAINING PROGRAM

## 2024-05-13 PROCEDURE — 3008F BODY MASS INDEX DOCD: CPT | Mod: CPTII,,, | Performed by: STUDENT IN AN ORGANIZED HEALTH CARE EDUCATION/TRAINING PROGRAM

## 2024-05-13 PROCEDURE — 3066F NEPHROPATHY DOC TX: CPT | Mod: CPTII,,, | Performed by: STUDENT IN AN ORGANIZED HEALTH CARE EDUCATION/TRAINING PROGRAM

## 2024-05-13 PROCEDURE — 1159F MED LIST DOCD IN RCRD: CPT | Mod: CPTII,,, | Performed by: STUDENT IN AN ORGANIZED HEALTH CARE EDUCATION/TRAINING PROGRAM

## 2024-05-13 RX ORDER — TIRZEPATIDE 2.5 MG/.5ML
2.5 INJECTION, SOLUTION SUBCUTANEOUS
Qty: 4 PEN | Refills: 0 | Status: SHIPPED | OUTPATIENT
Start: 2024-05-13 | End: 2024-06-10

## 2024-05-13 RX ORDER — LANCETS 33 GAUGE
EACH MISCELLANEOUS
COMMUNITY
Start: 2024-04-30

## 2024-05-13 RX ORDER — OMEPRAZOLE 40 MG/1
40 CAPSULE, DELAYED RELEASE ORAL EVERY MORNING
Qty: 30 CAPSULE | Refills: 5 | Status: SHIPPED | OUTPATIENT
Start: 2024-05-13

## 2024-05-13 RX ORDER — ALLOPURINOL 200 MG/1
200 TABLET ORAL DAILY
Qty: 90 TABLET | Refills: 3 | Status: SHIPPED | OUTPATIENT
Start: 2024-05-13 | End: 2024-08-11

## 2024-05-13 RX ORDER — ATORVASTATIN CALCIUM 40 MG/1
40 TABLET, FILM COATED ORAL DAILY
Qty: 90 TABLET | Refills: 3 | Status: SHIPPED | OUTPATIENT
Start: 2024-05-13 | End: 2025-05-13

## 2024-05-13 NOTE — TELEPHONE ENCOUNTER
----- Message from Chelo Mijarse MD sent at 5/13/2024  9:03 AM CDT -----  Please get the Pap smear results from [facility] [place]  Phone [phone]

## 2024-05-13 NOTE — PROGRESS NOTES
Patient ID: [id number]     Chief Complaint: Annual Exam        HPI:      Disclaimer:  This note is prepared using voice recognition software and as such is likely to have errors despite attempts at proofreading. Please contact me for questions.    Virgie Light is a 58 y.o. female here today for an annual wellness visit.    Patient has following issues to discuss today    Acute Issues-  Diabetes mellitus  Hemoglobin A1c   Date Value Ref Range Status   2024 6.7 <=7.0 % Final   Is on Metformin  Reported in last clinic visit that Mom had a hx of pancreatic cancer but was not sure. Today she reports that she confirmed Lung cancer. She was a smoker    Acid reflux-  On Prilosec  Asx     Current use of long term anticoagulation  Personal history of pulmonary embolism  CHF-  On fursemide, lisinopril  On Elliquis x BID  Sees Hem/ onc     Unspecified atrial fibrillation   Sees Dr. Billingsley at [facility]     Hypertension  On Lisinopril  At goal  asx     Hyperlipidemia  On Atorvastatin  Leg cramps resolved after starting Atorvastatin     Moderate episode of recurrent major depressive disorder  Not on meds  PHQ9- 3     Moderate persistent asthma, uncomplicated  Well controlled  On Albuterol and Advair     Undifferentiated connective tissue disease  Dced Methotrexate, Plaquanil, Duloxetine    Chronic Issues-  -------------------------------------    Acid reflux    Current use of long term anticoagulation    Diabetes mellitus    Hyperlipidemia    Hypertension    Moderate episode of recurrent major depressive disorder    Moderate persistent asthma, uncomplicated    Personal history of pulmonary embolism    Undifferentiated connective tissue disease    Unspecified atrial fibrillation        Past Surgical History:   Procedure Laterality Date     SECTION      HERNIA REPAIR      OOPHORECTOMY Left 2013    OVARY SURGERY      TUBAL LIGATION         Review of patient's allergies indicates:   Allergen Reactions    Iodine      Morphine Other (See Comments)    Opioids - morphine analogues Other (See Comments)     Makes her feel hot and bad    Shrimp Itching    Theophenyllin Palpitations    Theophylline Palpitations       Current Outpatient Medications   Medication Instructions    ADVAIR DISKUS 250-50 mcg/dose diskus inhaler 2 times daily    albuterol (PROVENTIL/VENTOLIN HFA) 90 mcg/actuation inhaler 2 puffs, 4 times daily PRN    allopurinoL (ZYLOPRIM) 100 mg, Oral, 2 times daily    atorvastatin (LIPITOR) 20 mg, Oral, Daily    blood-glucose meter kit 1 each, Other, 2 times daily, Use as instructed    cholecalciferol (vitamin D3) 50,000 Units, Oral, Weekly    diclofenac sodium (SOLARAZE) 3 % gel APPLY 1/2 GRAM TOPICALLY TO AFFECTED AREA TWICE DAILY    DULoxetine (CYMBALTA) 60 mg, Oral, Every morning    ELIQUIS 5 mg, Oral, 2 times daily    folic acid (FOLVITE) 1,000 mcg, Oral, Daily    furosemide (LASIX) 20 mg, Oral, Daily    hydroxychloroquine (PLAQUENIL) 200 mg, Oral, 2 times daily    lisinopriL 10 mg, Oral, Daily    metFORMIN (GLUCOPHAGE) 500 mg, Oral, With breakfast    methotrexate 10 mg, Oral, Every 7 days    omeprazole (PRILOSEC) 40 mg, Oral, Every morning    ONETOUCH DELICA PLUS LANCET 30 gauge Misc        Social History     Socioeconomic History    Marital status: Single   Tobacco Use    Smoking status: Never     Passive exposure: Never    Smokeless tobacco: Never   Substance and Sexual Activity    Alcohol use: Never    Drug use: Never    Sexual activity: Not Currently        Family History   Problem Relation Name Age of Onset    Ovarian cancer Mother      COPD Mother      Diabetes Mellitus Mother      Hypertension Mother      Cancer Mother      Hypertension Father      Diabetes Mellitus Father      Cancer Father      Dementia Father      Ovarian cancer Sister      No Known Problems Daughter      No Known Problems Maternal Aunt      No Known Problems Maternal Uncle      No Known Problems Paternal Aunt      No Known Problems Paternal  "Uncle      No Known Problems Maternal Grandmother      No Known Problems Maternal Grandfather      No Known Problems Paternal Grandmother      No Known Problems Paternal Grandfather      No Known Problems Other      BRCA 1/2 Neg Hx          Patient Care Team:  Chelo Mijares MD as PCP - General (Family Medicine)  Kody Saab MD as Consulting Physician (Gastroenterology)       Subjective:     ROS    See HPI for details    Constitutional: Denies Change in appetite. Denies Chills. Denies Fever. Denies Night sweats.  Respiratory: Denies Cough. Denies Shortness of breath. Denies Shortness of breath with exertion. Denies Wheezing.  Cardiovascular: DeniesChest pain at rest. Denies Chest pain with exertion. Denies Irregular heartbeat. Denies Palpitations. Denies Edema.  Gastrointestinal: Denies Abdominal pain. DeniesDiarrhea. Denies Nausea. Denies Vomiting. Denies Hematemesis or Hematochezia.  Genitourinary: Denies Dysuria. Denies Urinary frequency. Denies Urinary urgency. Denies Blood in urine.  Endocrine: Denies Cold intolerance. Denies Excessive thirst. Denies Heat intolerance. Denies Weight loss. Denies Weight gain.  Musculoskeletal: Denies Painful joints. Denies Weakness.  Integumentary: Denies Rash. Denies Itching. Denies Dry skin.  Neurologic: Denies Dizziness. Denies Fainting. Denies Headache.  Psychiatric: Denies Depression. Denies Anxiety. Denies Suicidal/Homicidal ideations.    All Other ROS: Negative except as stated in HPI.       Objective:     Visit Vitals  /65 (BP Location: Left arm, Patient Position: Sitting, BP Method: Large (Automatic))   Pulse 80   Temp 98.1 °F (36.7 °C) (Oral)   Resp 20   Ht 5' 1" (1.549 m)   Wt 109.8 kg (242 lb)   LMP  (LMP Unknown)   SpO2 98%   BMI 45.73 kg/m²       Physical Exam    General: Alert and oriented, obese, No acute distress.  Neck/Thyroid: Supple, Non-tender  Respiratory: Clear to auscultation bilaterally; No wheezes  Cardiovascular: Regular rate and " rhythm  Gastrointestinal: Soft, Non-tender,No palpable organomegaly.  Musculoskeletal: Normal range of motion.  Neurologic: No focal deficits  Psychiatric: Normal interaction, Coherent speech, Euthymic mood, Appropriate affect       Assessment:       ICD-10-CM ICD-9-CM   1. Wellness examination  Z00.00 V70.0   2. Mild episode of recurrent major depressive disorder  F33.0 296.31   3. Moderate persistent asthma, uncomplicated  J45.40 493.90   4. Primary hypertension  I10 401.9   5. Longstanding persistent atrial fibrillation  I48.11 427.31   6. Current use of long term anticoagulation  Z79.01 V58.61   7. Personal history of pulmonary embolism  Z86.711 V12.55   8. Type 2 diabetes mellitus with hyperglycemia, without long-term current use of insulin  E11.65 250.00     790.29   9. Vitamin D deficiency  E55.9 268.9   10. History of peptic ulcer disease  Z87.11 V12.71   11. Mixed hyperlipidemia  E78.2 272.2        Plan:         Health Maintenance Topics with due status: Not Due       Topic Last Completion Date    Colorectal Cancer Screening 02/05/2020    Mammogram 10/05/2023    TETANUS VACCINE 12/22/2023    Lipid Panel 03/13/2024          Vaccinations -   Immunization History   Administered Date(s) Administered    COVID-19, MRNA, LN-S, PF (MODERNA FULL 0.5 ML DOSE) 03/23/2021, 04/23/2021, 05/01/2023    COVID-19, mRNA, LNP-S, PF (Moderna 2023)Ages 12+ 12/22/2023    COVID-19, mRNA, LNP-S, bivalent booster, PF (Moderna Omicron)12 + YEARS 05/01/2023    Hepatitis B, Adult 06/17/2020, 12/01/2020    Influenza 05/01/2023    Influenza (FLUBLOK) - Quadrivalent - Recombinant - PF *Preferred* (egg allergy) 01/12/2020    Influenza - Quadrivalent - MDCK - PF 10/20/2020, 11/02/2022, 10/20/2023    Influenza - Quadrivalent - PF *Preferred* (6 months and older) 10/13/2016, 11/11/2016, 02/06/2018, 11/14/2018    Influenza - Trivalent - PF (ADULT) 10/13/2016, 11/11/2016, 02/06/2018, 11/14/2018    Pneumococcal Conjugate - 20 Valent 05/01/2023     Tdap 02/06/2018, 12/22/2023    Zoster Recombinant 05/01/2023, 07/28/2023        1. Wellness examination  -     CBC Auto Differential; Future; Expected date: 05/13/2024  -     Comprehensive Metabolic Panel; Future; Expected date: 05/13/2024  -     Urinalysis; Future; Expected date: 05/13/2024  Cervical Cancer Screening - Pap Smear by  [facility].   Breast Cancer Screening - Last Mammogram Normal. Follow up in 1 year  Colon Cancer Screening - Colonoscopy is scheduled with Dr. aSab  Osteoporosis Screening -  DEXA after 65  Eye Exam - Recommend annually.  Dental Exam - Recommend biannual exams.     Diet discussed (healthy food choices, reduce portions and overall calorie intake)  Exercise 30-45 minutes 5x per week  Avoid excessive alcohol and tobacco if taking  Immunizations dicussed  Monthly breast exam recommended  Preventative exams discussed.      2. Mild episode of recurrent major depressive disorder  PHQ-9 score today is 3  Currently off of all the medications   Continue to monitor    3. Moderate persistent asthma, uncomplicated  Asymptomatic   Continue to monitor    4. Primary hypertension  At goal   Recommend to continue Rx as prescribed   Keep goal blood pressure less than 130/80  Continue 35 minutes of brisk walking, 5 days in a week  Continue low sodium Diet (DASH Diet - Less than 2 grams of sodium per day).  Recommend to quit smoking if smoking  Maintain healthy weight with goal BMI <25.    5. Longstanding persistent atrial fibrillation  6. Current use of long term anticoagulation  7. Personal history of pulmonary embolism  Continue Eliquis     8. Type 2 diabetes mellitus with hyperglycemia, without long-term current use of insulin  -     tirzepatide (MOUNJARO) 2.5 mg/0.5 mL PnIj; Inject 2.5 mg into the skin every 7 days.  Dispense: 4 Pen; Refill: 0  -     Diabetic Eye Screening Photo; Future  - Denies of any personal/family history of pancreatic tumors/medullary carcinoma of thyroid/MEN  tumors, or any unexplained flushing  -Diet. exercise, and 10% weight loss encouraged.   -Rx trial of GLP1 -inhibitors given with close monitoring to help with insulin resistance/ obesity.  - Will titrate Rx as needed/tolerated until max dose achieved.  -Notify M.D. or ER if symptoms persist or worsen, adverse Rx side effects, temp greater than 100.4, or any acute illness.    9. Vitamin D deficiency  Start vitamin-D supplementations as prescribed   Encourage outdoor physical activity    10. History of peptic ulcer disease  Recommend to avoid last meal of the day no later than 7:00 p.m.   Avoid fatty/greasy/means/chocolate  -     omeprazole (PRILOSEC) 40 MG capsule; Take 1 capsule (40 mg total) by mouth every morning.  Dispense: 30 capsule; Refill: 5    11. Mixed hyperlipidemia  Increase Lipitor to 40 mg as patient has bilateral lower extremity stents  -     atorvastatin (LIPITOR) 40 MG tablet; Take 1 tablet (40 mg total) by mouth once daily.  Dispense: 90 tablet; Refill: 3    12. Breast cancer screening by mammogram  -     Mammo Digital Screening Bilat w/ Edgardo; Future; Expected date: 10/06/2024    13. Fibromyalgia  Well-controlled   Off of Plaquenil/methotrexate/duloxetine    14. Hyperuricemia  Continue uric acid avoid alcohol/red meat  -     allopurinoL 200 mg Tab; Take 200 mg by mouth once daily.  Dispense: 90 tablet; Refill: 3    15. Well adult health check  -     CBC Auto Differential; Future; Expected date: 05/13/2025  -     Comprehensive Metabolic Panel; Future; Expected date: 05/13/2025  -     Lipid Panel; Future; Expected date: 05/13/2025  -     TSH; Future; Expected date: 05/13/2025  -     Hemoglobin A1C; Future; Expected date: 05/13/2025  -     Urinalysis; Future; Expected date: 05/13/2025  -     Microalbumin/Creatinine Ratio, Urine; Future; Expected date: 05/13/2025  -     Vitamin D; Future; Expected date: 05/13/2025  -     Uric Acid; Future; Expected date: 05/13/2025         Medication List with  Changes/Refills   Current Medications    ADVAIR DISKUS 250-50 MCG/DOSE DISKUS INHALER    2 (two) times daily.       Start Date: 4/24/2023 End Date: --    ALBUTEROL (PROVENTIL/VENTOLIN HFA) 90 MCG/ACTUATION INHALER    2 puffs 4 (four) times daily as needed.       Start Date: 5/1/2023  End Date: --    ALLOPURINOL (ZYLOPRIM) 100 MG TABLET    Take 1 tablet (100 mg total) by mouth 2 (two) times daily.       Start Date: 3/18/2024 End Date: 6/16/2024    ATORVASTATIN (LIPITOR) 20 MG TABLET    Take 1 tablet (20 mg total) by mouth once daily.       Start Date: 2/26/2024 End Date: 2/25/2025    BLOOD-GLUCOSE METER KIT    1 each by Other route 2 (two) times daily. Use as instructed       Start Date: 4/30/2024 End Date: --    CHOLECALCIFEROL, VITAMIN D3, 1,250 MCG (50,000 UNIT) CAPSULE    Take 1 capsule (50,000 Units total) by mouth once a week.       Start Date: 9/8/2023  End Date: --    DICLOFENAC SODIUM (SOLARAZE) 3 % GEL    APPLY 1/2 GRAM TOPICALLY TO AFFECTED AREA TWICE DAILY       Start Date: 9/12/2023 End Date: --    DULOXETINE (CYMBALTA) 60 MG CAPSULE    Take 1 capsule (60 mg total) by mouth every morning.       Start Date: 9/8/2023  End Date: --    ELIQUIS 5 MG TAB    Take 5 mg by mouth 2 (two) times daily.       Start Date: 4/20/2023 End Date: --    FOLIC ACID (FOLVITE) 1 MG TABLET    Take 1 tablet (1,000 mcg total) by mouth once daily.       Start Date: 9/8/2023  End Date: --    FUROSEMIDE (LASIX) 20 MG TABLET    Take 1 tablet (20 mg total) by mouth once daily.       Start Date: 4/12/2024 End Date: --    HYDROXYCHLOROQUINE (PLAQUENIL) 200 MG TABLET    Take 1 tablet (200 mg total) by mouth 2 (two) times daily.       Start Date: 9/8/2023  End Date: --    LISINOPRIL 10 MG TABLET    Take 1 tablet (10 mg total) by mouth once daily.       Start Date: 4/1/2024  End Date: --    METFORMIN (GLUCOPHAGE) 500 MG TABLET    Take 500 mg by mouth daily with breakfast.       Start Date: 7/26/2023 End Date: --    METHOTREXATE 2.5 MG TAB     Take 4 tablets (10 mg total) by mouth every 7 days.       Start Date: 9/8/2023  End Date: --    OMEPRAZOLE (PRILOSEC) 40 MG CAPSULE    Take 1 capsule (40 mg total) by mouth every morning.       Start Date: 9/8/2023  End Date: --    ONETOUCH DELICA PLUS LANCET 30 GAUGE MISC           Start Date: 4/30/2024 End Date: --          The patient's Health Maintenance was reviewed and the following appears to be due at this time:   Health Maintenance Due   Topic Date Due    Cervical Cancer Screening  Never done    COVID-19 Vaccine (5 - 2023-24 season) 02/16/2024         Follow up in about 4 weeks (around 6/10/2024) for Follow Up M[facility]. 1 AW.   In addition to their scheduled follow up, the patient has also been instructed to follow up on as needed basis.

## 2024-05-13 NOTE — TELEPHONE ENCOUNTER
Faxed to [facility].   ORTHOPEDIC FOOT & ANKLE SURGERY FOLLOW-UP NOTE    IMPRESSION:  85yo with Right 2-5 hammertoes, rigid, painful     PLAN:   -we discussed the treatment options to include surgical and nonsurgical.  The patient would want to proceed with surgical intervention of the 2 through 5th hammertoe.  We discussed the risks, alternatives, benefits of the procedure.  She will think about surgery.    -she would want to return in December for potential surgery in February.  She will make this appointment today.      Follow-up: Return in about 7 months (around 12/30/2023).      James Ortiz MD  Orthopedic Surgeon, Foot & Ankle Surgery  [facility] Location    ------------------------------------------------------------------------------------------------------------    CHIEF COMPLAINT:  Chief Complaint   Patient presents with   • Office Visit     Right foot pain         HISTORY OF PRESENT ILLNESS:  She is here for follow-up.      5/30/23: Since last being seen she has had worsening right lesser toe pain and deformity.  She is continued with callus debridement, shoe modification activity modification.  The pain is getting increasingly worse.  She has not had any new ulcers.  She is considering proceeding with hammertoe surgery.    CURRENT MEDICATIONS:    Current Outpatient Medications   Medication   • famotidine (Pepcid) 20 MG tablet   • Pseudoephedrine-Guaifen (Mucinex D Max Strength) 120-1200 MG per tablet   • furosemide (LASIX) 20 MG tablet   • citalopram (CeleXA) 20 MG tablet   • silver sulfADIAZINE (Silvadene) 1 % cream   • modafinil (PROVIGIL) 200 MG tablet   • oxyCODONE, IMM REL, (ROXICODONE) 5 MG immediate release tablet   • DISPENSE   • DISPENSE   • Misc Natural Products (ECHINACEA COMPOUND PO)   • cannabidiol (CBD) oil   • diphenhydramine-acetaminophen (TYLENOL PM EXTRA STRENGTH)  MG Tab   • DISPENSE   • benazepril-hydrochlorthiazide (LOTENSIN HCT) 20-25 MG per tablet   • fluticasone  (FLONASE) 50 MCG/ACT nasal spray   • B Complex-Biotin-FA (B COMPLETE) Tab   • Carboxymethylcellul-Glycerin (REFRESH OPTIVE) 1-0.9 % Gel   • clindamycin (CLEOCIN) 150 MG capsule   • NARCAN 4 MG/0.1ML nasal spray   • OXYCONTIN 10 MG 12 hr tablet   • Multiple Vitamins-Minerals (OPTIVITE PO)   • docusate calcium (STOOL SOFTENER) 240 MG capsule   • ketoconazole (NIZORAL) 2 % cream   • hydroCORTisone (CORTIZONE) 2.5 % cream   • nystatin (MYCOSTATIN) 157135 UNIT/GM cream   • Oxymetazoline HCl (MUCINEX NASAL SPRAY FULL FORCE NA)   • DISPENSE   • albuterol 108 (90 BASE) MCG/ACT inhaler   • aspirin (ASPIRIN EC LO-DOSE) 81 MG EC tablet   • diphenhydrAMINE (BENADRYL) 25 MG capsule   • Cholecalciferol (VITAMIN D3) 5000 UNITS CAPS     Current Facility-Administered Medications   Medication   • betamethasone acet/sod phos (CELESTONE) injection 9 mg         PHYSICIAL EXAMINATION:    Vital Signs: There were no vitals taken for this visit.  General:  The patient is alert and oriented x 3. She is in no acute distress.  Right foot:  Small callus distal aspect of 3rd toe.  Rigid hammertoes of 2 through 5.  Dynamic extension of the 1st metatarsophalangeal joint.  Tenderness at the distal aspect of the 2nd and 3rd toe.  No significant swelling in the toes, no erythema.  Dysmorphic toenails.  Tenderness and toes 2 through 5.   Sensation:  Intact to light touch in superficial peroneal, deep peroneal, sural, saphenous, tibial nerve distribution        CLINIC VISIT IMAGIN23  clinic visit imaging personally read by myself including:   RIGHT Foot XR: Medial talus uncoverage on the midfoot arthritic changes, lesser hammertoes of 2 through 5.

## 2024-05-14 ENCOUNTER — DOCUMENTATION ONLY (OUTPATIENT)
Dept: FAMILY MEDICINE | Facility: CLINIC | Age: 59
End: 2024-05-14
Payer: COMMERCIAL

## 2024-05-27 PROBLEM — I26.99 PULMONARY EMBOLISM: Status: RESOLVED | Noted: 2024-02-23 | Resolved: 2024-05-27

## 2024-06-04 ENCOUNTER — TELEPHONE (OUTPATIENT)
Dept: FAMILY MEDICINE | Facility: CLINIC | Age: 59
End: 2024-06-04
Payer: COMMERCIAL

## 2024-06-07 ENCOUNTER — TELEPHONE (OUTPATIENT)
Dept: FAMILY MEDICINE | Facility: CLINIC | Age: 59
End: 2024-06-07
Payer: COMMERCIAL

## 2024-06-07 NOTE — TELEPHONE ENCOUNTER
Are there any outstanding tasks in the patients's chart (ex.labs,MM,etc)? no  Do we have outstanding/pending referrals?o  Has the patient been seen in and ER,UCC, or been admitted since last visit?no  Has the patient seen any other health care provider(doctors) since last visit?no  Has the patient had any bloodwork or x-rays done since last visit?no

## 2024-06-10 ENCOUNTER — OFFICE VISIT (OUTPATIENT)
Dept: FAMILY MEDICINE | Facility: CLINIC | Age: 59
End: 2024-06-10
Payer: COMMERCIAL

## 2024-06-10 ENCOUNTER — CLINICAL SUPPORT (OUTPATIENT)
Dept: FAMILY MEDICINE | Facility: CLINIC | Age: 59
End: 2024-06-10
Attending: STUDENT IN AN ORGANIZED HEALTH CARE EDUCATION/TRAINING PROGRAM
Payer: COMMERCIAL

## 2024-06-10 VITALS
SYSTOLIC BLOOD PRESSURE: 127 MMHG | RESPIRATION RATE: 20 BRPM | TEMPERATURE: 98 F | DIASTOLIC BLOOD PRESSURE: 79 MMHG | BODY MASS INDEX: 43.24 KG/M2 | OXYGEN SATURATION: 99 % | HEIGHT: 62 IN | WEIGHT: 235 LBS | HEART RATE: 80 BPM

## 2024-06-10 DIAGNOSIS — F40.243 FLYING PHOBIA: ICD-10-CM

## 2024-06-10 DIAGNOSIS — E66.01 CLASS 3 SEVERE OBESITY DUE TO EXCESS CALORIES WITH SERIOUS COMORBIDITY AND BODY MASS INDEX (BMI) OF 50.0 TO 59.9 IN ADULT: ICD-10-CM

## 2024-06-10 DIAGNOSIS — E11.65 TYPE 2 DIABETES MELLITUS WITH HYPERGLYCEMIA, WITHOUT LONG-TERM CURRENT USE OF INSULIN: ICD-10-CM

## 2024-06-10 DIAGNOSIS — R11.0 NAUSEA: ICD-10-CM

## 2024-06-10 PROCEDURE — 92228 IMG RTA DETC/MNTR DS PHY/QHP: CPT | Mod: TC,,, | Performed by: STUDENT IN AN ORGANIZED HEALTH CARE EDUCATION/TRAINING PROGRAM

## 2024-06-10 PROCEDURE — 1159F MED LIST DOCD IN RCRD: CPT | Mod: CPTII,,, | Performed by: STUDENT IN AN ORGANIZED HEALTH CARE EDUCATION/TRAINING PROGRAM

## 2024-06-10 PROCEDURE — 3078F DIAST BP <80 MM HG: CPT | Mod: CPTII,,, | Performed by: STUDENT IN AN ORGANIZED HEALTH CARE EDUCATION/TRAINING PROGRAM

## 2024-06-10 PROCEDURE — 3044F HG A1C LEVEL LT 7.0%: CPT | Mod: CPTII,,, | Performed by: STUDENT IN AN ORGANIZED HEALTH CARE EDUCATION/TRAINING PROGRAM

## 2024-06-10 PROCEDURE — 1160F RVW MEDS BY RX/DR IN RCRD: CPT | Mod: CPTII,,, | Performed by: STUDENT IN AN ORGANIZED HEALTH CARE EDUCATION/TRAINING PROGRAM

## 2024-06-10 PROCEDURE — 3066F NEPHROPATHY DOC TX: CPT | Mod: CPTII,,, | Performed by: STUDENT IN AN ORGANIZED HEALTH CARE EDUCATION/TRAINING PROGRAM

## 2024-06-10 PROCEDURE — 3061F NEG MICROALBUMINURIA REV: CPT | Mod: CPTII,,, | Performed by: STUDENT IN AN ORGANIZED HEALTH CARE EDUCATION/TRAINING PROGRAM

## 2024-06-10 PROCEDURE — 2025F 7 FLD RTA PHOTO W/O RTNOPTHY: CPT | Mod: CPTII,,, | Performed by: OPTOMETRIST

## 2024-06-10 PROCEDURE — 3008F BODY MASS INDEX DOCD: CPT | Mod: CPTII,,, | Performed by: STUDENT IN AN ORGANIZED HEALTH CARE EDUCATION/TRAINING PROGRAM

## 2024-06-10 PROCEDURE — 4010F ACE/ARB THERAPY RXD/TAKEN: CPT | Mod: CPTII,,, | Performed by: STUDENT IN AN ORGANIZED HEALTH CARE EDUCATION/TRAINING PROGRAM

## 2024-06-10 PROCEDURE — 92228 IMG RTA DETC/MNTR DS PHY/QHP: CPT | Mod: 26,,, | Performed by: OPTOMETRIST

## 2024-06-10 PROCEDURE — 99214 OFFICE O/P EST MOD 30 MIN: CPT | Mod: ,,, | Performed by: STUDENT IN AN ORGANIZED HEALTH CARE EDUCATION/TRAINING PROGRAM

## 2024-06-10 PROCEDURE — 3074F SYST BP LT 130 MM HG: CPT | Mod: CPTII,,, | Performed by: STUDENT IN AN ORGANIZED HEALTH CARE EDUCATION/TRAINING PROGRAM

## 2024-06-10 RX ORDER — ALPRAZOLAM 0.25 MG/1
0.25 TABLET ORAL DAILY PRN
Qty: 15 TABLET | Refills: 0 | Status: SHIPPED | OUTPATIENT
Start: 2024-06-10 | End: 2024-06-25

## 2024-06-10 RX ORDER — TIRZEPATIDE 5 MG/.5ML
5 INJECTION, SOLUTION SUBCUTANEOUS
Qty: 2 ML | Refills: 0 | Status: SHIPPED | OUTPATIENT
Start: 2024-06-10 | End: 2024-07-10

## 2024-06-10 RX ORDER — ONDANSETRON 4 MG/1
4 TABLET, ORALLY DISINTEGRATING ORAL EVERY 8 HOURS PRN
Qty: 24 TABLET | Refills: 0 | Status: SHIPPED | OUTPATIENT
Start: 2024-06-10 | End: 2024-06-25

## 2024-06-10 NOTE — PROGRESS NOTES
Patient ID: [id number]     Chief Complaint: Follow-up        HPI:      Disclaimer:  This note is prepared using voice recognition software and as such is likely to have errors despite attempts at proofreading. Please contact me for questions.     Virgie Light is a 58 y.o. female here today for the following      Acute Issues-  Diabetes mellitus        Hemoglobin A1c   Date Value Ref Range Status   2024 6.7 <=7.0 % Final   On Mounjaro   Had nausea    Phobia   Requesting for Xanax     Obesity   BMI 43.25    Chronic Issues-    -------------------------------------    Acid reflux    Current use of long term anticoagulation    Diabetes mellitus    Hyperlipidemia    Hypertension    Moderate episode of recurrent major depressive disorder    Moderate persistent asthma, uncomplicated    Personal history of pulmonary embolism    Undifferentiated connective tissue disease    Unspecified atrial fibrillation        Past Surgical History:   Procedure Laterality Date     SECTION      HERNIA REPAIR      OOPHORECTOMY Left 2013    OVARY SURGERY      TUBAL LIGATION         Review of patient's allergies indicates:   Allergen Reactions    Iodine     Morphine Other (See Comments)    Opioids - morphine analogues Other (See Comments)     Makes her feel hot and bad    Shrimp Itching    Theophenyllin Palpitations    Theophylline Palpitations       Current Outpatient Medications   Medication Instructions    ADVAIR DISKUS 250-50 mcg/dose diskus inhaler 2 times daily    albuterol (PROVENTIL/VENTOLIN HFA) 90 mcg/actuation inhaler 2 puffs, 4 times daily PRN    allopurinoL 200 mg, Oral, Daily    atorvastatin (LIPITOR) 40 mg, Oral, Daily    blood-glucose meter kit 1 each, Other, 2 times daily, Use as instructed    cholecalciferol (vitamin D3) 50,000 Units, Oral, Weekly    diclofenac sodium (SOLARAZE) 3 % gel APPLY 1/2 GRAM TOPICALLY TO AFFECTED AREA TWICE DAILY    ELIQUIS 5 mg, Oral, 2 times daily    folic acid (FOLVITE) 1,000  mcg, Oral, Daily    furosemide (LASIX) 20 mg, Oral, Daily    lisinopriL 10 mg, Oral, Daily    metFORMIN (GLUCOPHAGE) 500 mg, Oral, With breakfast    MOUNJARO 2.5 mg, Subcutaneous, Every 7 days    omeprazole (PRILOSEC) 40 mg, Oral, Every morning    ONETOUCH DELICA PLUS LANCET 30 gauge Misc        Social History     Socioeconomic History    Marital status: Single   Tobacco Use    Smoking status: Never     Passive exposure: Never    Smokeless tobacco: Never   Substance and Sexual Activity    Alcohol use: Never    Drug use: Never    Sexual activity: Not Currently        Family History   Problem Relation Name Age of Onset    Ovarian cancer Mother      COPD Mother      Diabetes Mellitus Mother      Hypertension Mother      Cancer Mother      Hypertension Father      Diabetes Mellitus Father      Cancer Father      Dementia Father      Ovarian cancer Sister      No Known Problems Daughter      No Known Problems Maternal Aunt      No Known Problems Maternal Uncle      No Known Problems Paternal Aunt      No Known Problems Paternal Uncle      No Known Problems Maternal Grandmother      No Known Problems Maternal Grandfather      No Known Problems Paternal Grandmother      No Known Problems Paternal Grandfather      No Known Problems Other      BRCA 1/2 Neg Hx          Patient Care Team:  Chelo Mijares MD as PCP - General (Family Medicine)  Kody Saab MD as Consulting Physician (Gastroenterology)     Subjective:     ROS    See HPI for details    Constitutional: Denies Change in appetite. Denies Chills. Denies Fever. Denies Night sweats.  Respiratory: Denies Cough. Denies Shortness of breath. Denies Shortness of breath with exertion. Denies Wheezing.  Cardiovascular: DeniesChest pain at rest. Denies Chest pain with exertion. Denies Irregular heartbeat. Denies Palpitations. Denies Edema.  Gastrointestinal: Denies Abdominal pain. DeniesDiarrhea. Denies Nausea. Denies Vomiting. Denies Hematemesis or  "Hematochezia.  Genitourinary: Denies Dysuria. Denies Urinary frequency. Denies Urinary urgency. Denies Blood in urine.  Endocrine: Denies Cold intolerance. Denies Excessive thirst. Denies Heat intolerance. Denies Weight loss. Denies Weight gain.  Musculoskeletal: Denies Painful joints. Denies Weakness.  Integumentary: Denies Rash. Denies Itching. Denies Dry skin.  Neurologic: Denies Dizziness. Denies Fainting. Denies Headache.  Psychiatric: Denies Depression. Denies Anxiety. Denies Suicidal/Homicidal ideations.    All Other ROS: Negative except as stated in HPI.  Objective:     Visit Vitals  /79 (BP Location: Left arm, Patient Position: Sitting, BP Method: Large (Automatic))   Pulse 80   Temp 98.2 °F (36.8 °C) (Oral)   Resp 20   Ht 5' 1.81" (1.57 m)   Wt 106.6 kg (235 lb)   LMP  (LMP Unknown)   SpO2 99%   BMI 43.25 kg/m²       Physical Exam    General: Alert and oriented, obese,No acute distress.  Respiratory: Clear to auscultation bilaterally; No wheezes, rales or rhonchi,Non-labored respirations, Symmetrical chest wall expansion.  Cardiovascular: Regular rate and rhythm, S1/S2 normal, No murmurs, rubs or gallops.  Gastrointestinal: Soft, Non-tender, Non-distended, Normal bowel sounds, No palpable organomegaly.  Musculoskeletal: Normal range of motion.  Extremities: No clubbing, cyanosis or edema  Neurologic: No focal deficits  Psychiatric: Normal interaction, Coherent speech, Euthymic mood, Appropriate affect   Assessment:       ICD-10-CM ICD-9-CM   1. Type 2 diabetes mellitus with hyperglycemia, without long-term current use of insulin  E11.65 250.00     790.29   2. Nausea  R11.0 787.02   3. Flying phobia  F40.243 300.29   4. Class 3 severe obesity due to excess calories with serious comorbidity and body mass index (BMI) of 50.0 to 59.9 in adult  E66.01 278.01    Z68.43 V85.43        Plan:     1. Type 2 diabetes mellitus with hyperglycemia, without long-term current use of insulin  Increase Mounjaro to 5 " mg  Continue Diabetic diet and 35 minutes of brisk walking  -     tirzepatide (MOUNJARO) 5 mg/0.5 mL PnIj; Inject 5 mg into the skin every 7 days.  Dispense: 2 mL; Refill: 0  -     Diabetic Eye Screening Photo; Future    2. Nausea  -     ondansetron (ZOFRAN-ODT) 4 MG TbDL; Take 1 tablet (4 mg total) by mouth every 8 (eight) hours as needed (nausea).  Dispense: 24 tablet; Refill: 0    3. Flying phobia  -     ALPRAZolam (XANAX) 0.25 MG tablet; Take 1 tablet (0.25 mg total) by mouth daily as needed for Anxiety.  Dispense: 15 tablet; Refill: 0    4. Class 3 severe obesity due to excess calories with serious comorbidity and body mass index (BMI) of 50.0 to 59.9 in adult  Body mass index is 43.25 kg/m².  Goal BMI <30.  Exercise 5 times a week for 30 minutes per day.  Avoid soda, simple sugars, excessive rice, potatoes or bread. Limit fast foods and fried foods.  Choose complex carbs in moderation (example: green vegetables, beans, oatmeal). Eat plenty of fresh fruits and vegetables with lean meats daily.  Do not skip meals. Eat a balanced portion size.  Avoid fad diets. Consider permanent healthy life style changes.            Medication List with Changes/Refills   Current Medications    ADVAIR DISKUS 250-50 MCG/DOSE DISKUS INHALER    2 (two) times daily.       Start Date: 4/24/2023 End Date: --    ALBUTEROL (PROVENTIL/VENTOLIN HFA) 90 MCG/ACTUATION INHALER    2 puffs 4 (four) times daily as needed.       Start Date: 5/1/2023  End Date: --    ALLOPURINOL 200 MG TAB    Take 200 mg by mouth once daily.       Start Date: 5/13/2024 End Date: 8/11/2024    ATORVASTATIN (LIPITOR) 40 MG TABLET    Take 1 tablet (40 mg total) by mouth once daily.       Start Date: 5/13/2024 End Date: 5/13/2025    BLOOD-GLUCOSE METER KIT    1 each by Other route 2 (two) times daily. Use as instructed       Start Date: 4/30/2024 End Date: --    CHOLECALCIFEROL, VITAMIN D3, 1,250 MCG (50,000 UNIT) CAPSULE    Take 1 capsule (50,000 Units total) by  mouth once a week.       Start Date: 9/8/2023  End Date: --    DICLOFENAC SODIUM (SOLARAZE) 3 % GEL    APPLY 1/2 GRAM TOPICALLY TO AFFECTED AREA TWICE DAILY       Start Date: 9/12/2023 End Date: --    ELIQUIS 5 MG TAB    Take 5 mg by mouth 2 (two) times daily.       Start Date: 4/20/2023 End Date: --    FOLIC ACID (FOLVITE) 1 MG TABLET    Take 1 tablet (1,000 mcg total) by mouth once daily.       Start Date: 9/8/2023  End Date: --    FUROSEMIDE (LASIX) 20 MG TABLET    Take 1 tablet (20 mg total) by mouth once daily.       Start Date: 4/12/2024 End Date: --    LISINOPRIL 10 MG TABLET    Take 1 tablet (10 mg total) by mouth once daily.       Start Date: 4/1/2024  End Date: --    METFORMIN (GLUCOPHAGE) 500 MG TABLET    Take 500 mg by mouth daily with breakfast.       Start Date: 7/26/2023 End Date: --    OMEPRAZOLE (PRILOSEC) 40 MG CAPSULE    Take 1 capsule (40 mg total) by mouth every morning.       Start Date: 5/13/2024 End Date: --    ONETOUCH DELICA PLUS LANCET 30 GAUGE MISC           Start Date: 4/30/2024 End Date: --    TIRZEPATIDE (MOUNJARO) 2.5 MG/0.5 ML PNIJ    Inject 2.5 mg into the skin every 7 days.       Start Date: 5/13/2024 End Date: 6/12/2024          Follow up in about 6 weeks (around 7/22/2024) for Virtual Visit DM. .   In addition to their scheduled follow up, the patient has also been instructed to follow up on as needed basis.

## 2024-06-12 ENCOUNTER — PATIENT MESSAGE (OUTPATIENT)
Dept: FAMILY MEDICINE | Facility: CLINIC | Age: 59
End: 2024-06-12
Payer: COMMERCIAL

## 2024-06-24 DIAGNOSIS — E11.65 TYPE 2 DIABETES MELLITUS WITH HYPERGLYCEMIA, WITHOUT LONG-TERM CURRENT USE OF INSULIN: Primary | ICD-10-CM

## 2024-06-24 RX ORDER — METFORMIN HYDROCHLORIDE 500 MG/1
500 TABLET ORAL
Qty: 90 TABLET | Refills: 3 | Status: SHIPPED | OUTPATIENT
Start: 2024-06-24

## 2024-07-03 ENCOUNTER — TELEPHONE (OUTPATIENT)
Dept: FAMILY MEDICINE | Facility: CLINIC | Age: 59
End: 2024-07-03
Payer: COMMERCIAL

## 2024-07-03 NOTE — TELEPHONE ENCOUNTER
I tried to call the pt and her  to let her know she needs an appointment with us to be cleared for Oral surgery. No answer I called over 6 times.

## 2024-07-08 DIAGNOSIS — E11.65 TYPE 2 DIABETES MELLITUS WITH HYPERGLYCEMIA, WITHOUT LONG-TERM CURRENT USE OF INSULIN: ICD-10-CM

## 2024-07-08 DIAGNOSIS — I10 PRIMARY HYPERTENSION: ICD-10-CM

## 2024-07-08 RX ORDER — TIRZEPATIDE 5 MG/.5ML
5 INJECTION, SOLUTION SUBCUTANEOUS
Qty: 2 ML | Refills: 0 | Status: SHIPPED | OUTPATIENT
Start: 2024-07-08 | End: 2024-08-07

## 2024-07-08 RX ORDER — FUROSEMIDE 20 MG/1
20 TABLET ORAL DAILY
Qty: 90 TABLET | Refills: 0 | Status: SHIPPED | OUTPATIENT
Start: 2024-07-08

## 2024-07-16 ENCOUNTER — DOCUMENTATION ONLY (OUTPATIENT)
Dept: RHEUMATOLOGY | Facility: CLINIC | Age: 59
End: 2024-07-16
Payer: COMMERCIAL

## 2024-07-16 NOTE — PROGRESS NOTES
Certified letter [id number] [phone] mailed to patient in regard to Rheumatology provider  from organization/ medication refills. Letter returned due to Virgie Light incorrect address, etc.

## 2025-01-09 ENCOUNTER — OFFICE VISIT (OUTPATIENT)
Dept: FAMILY MEDICINE | Facility: CLINIC | Age: 60
End: 2025-01-09
Payer: COMMERCIAL

## 2025-01-09 VITALS
BODY MASS INDEX: 43.99 KG/M2 | HEIGHT: 61 IN | RESPIRATION RATE: 18 BRPM | TEMPERATURE: 98 F | HEART RATE: 88 BPM | SYSTOLIC BLOOD PRESSURE: 130 MMHG | OXYGEN SATURATION: 98 % | DIASTOLIC BLOOD PRESSURE: 76 MMHG | WEIGHT: 233 LBS

## 2025-01-09 DIAGNOSIS — E66.813 CLASS 3 SEVERE OBESITY DUE TO EXCESS CALORIES WITH SERIOUS COMORBIDITY AND BODY MASS INDEX (BMI) OF 50.0 TO 59.9 IN ADULT: ICD-10-CM

## 2025-01-09 DIAGNOSIS — E78.2 MIXED HYPERLIPIDEMIA: ICD-10-CM

## 2025-01-09 DIAGNOSIS — E66.01 CLASS 3 SEVERE OBESITY DUE TO EXCESS CALORIES WITH SERIOUS COMORBIDITY AND BODY MASS INDEX (BMI) OF 50.0 TO 59.9 IN ADULT: ICD-10-CM

## 2025-01-09 DIAGNOSIS — Z12.31 BREAST CANCER SCREENING BY MAMMOGRAM: ICD-10-CM

## 2025-01-09 DIAGNOSIS — F33.1 MODERATE EPISODE OF RECURRENT MAJOR DEPRESSIVE DISORDER: ICD-10-CM

## 2025-01-09 DIAGNOSIS — I50.9 CONGESTIVE HEART FAILURE, UNSPECIFIED HF CHRONICITY, UNSPECIFIED HEART FAILURE TYPE: ICD-10-CM

## 2025-01-09 DIAGNOSIS — I48.11 LONGSTANDING PERSISTENT ATRIAL FIBRILLATION: ICD-10-CM

## 2025-01-09 DIAGNOSIS — E11.65 TYPE 2 DIABETES MELLITUS WITH HYPERGLYCEMIA, WITHOUT LONG-TERM CURRENT USE OF INSULIN: ICD-10-CM

## 2025-01-09 DIAGNOSIS — I10 PRIMARY HYPERTENSION: ICD-10-CM

## 2025-01-09 DIAGNOSIS — Z00.00 WELLNESS EXAMINATION: ICD-10-CM

## 2025-01-09 PROBLEM — K59.09 OTHER CONSTIPATION: Status: ACTIVE | Noted: 2025-01-09

## 2025-01-09 PROBLEM — D50.9 IRON DEFICIENCY ANEMIA, UNSPECIFIED: Status: ACTIVE | Noted: 2025-01-09

## 2025-01-09 PROBLEM — J45.909 ASTHMA: Status: ACTIVE | Noted: 2020-12-15

## 2025-01-09 PROBLEM — F41.9 ANXIETY: Status: ACTIVE | Noted: 2020-12-15

## 2025-01-09 RX ORDER — LISINOPRIL 30 MG/1
30 TABLET ORAL DAILY
Qty: 90 TABLET | Refills: 3 | Status: SHIPPED | OUTPATIENT
Start: 2025-01-09 | End: 2026-01-09

## 2025-01-09 RX ORDER — METFORMIN HYDROCHLORIDE 500 MG/1
500 TABLET ORAL
Qty: 90 TABLET | Refills: 3 | Status: SHIPPED | OUTPATIENT
Start: 2025-01-09

## 2025-01-09 RX ORDER — ATORVASTATIN CALCIUM 40 MG/1
40 TABLET, FILM COATED ORAL DAILY
Qty: 90 TABLET | Refills: 3 | Status: SHIPPED | OUTPATIENT
Start: 2025-01-09 | End: 2026-01-09

## 2025-01-09 RX ORDER — ESCITALOPRAM OXALATE 10 MG/1
10 TABLET ORAL DAILY
Qty: 90 TABLET | Refills: 3 | Status: SHIPPED | OUTPATIENT
Start: 2025-01-09 | End: 2026-01-09

## 2025-01-09 RX ORDER — TIRZEPATIDE 5 MG/.5ML
5 INJECTION, SOLUTION SUBCUTANEOUS
Qty: 2 ML | Refills: 2 | Status: SHIPPED | OUTPATIENT
Start: 2025-01-09 | End: 2025-04-09

## 2025-01-09 RX ORDER — LISINOPRIL 10 MG/1
10 TABLET ORAL DAILY
Qty: 90 TABLET | Refills: 3 | Status: SHIPPED | OUTPATIENT
Start: 2025-01-09 | End: 2025-01-09 | Stop reason: SDUPTHER

## 2025-01-10 DIAGNOSIS — I10 PRIMARY HYPERTENSION: ICD-10-CM

## 2025-01-10 RX ORDER — FUROSEMIDE 20 MG/1
20 TABLET ORAL DAILY
Qty: 90 TABLET | Refills: 0 | Status: SHIPPED | OUTPATIENT
Start: 2025-01-10

## 2025-01-15 ENCOUNTER — LAB VISIT (OUTPATIENT)
Dept: LAB | Facility: HOSPITAL | Age: 60
End: 2025-01-15
Payer: COMMERCIAL

## 2025-01-15 DIAGNOSIS — I26.99 PULMONARY EMBOLISM, UNSPECIFIED CHRONICITY, UNSPECIFIED PULMONARY EMBOLISM TYPE, UNSPECIFIED WHETHER ACUTE COR PULMONALE PRESENT: Primary | ICD-10-CM

## 2025-01-15 DIAGNOSIS — I26.99 PULMONARY EMBOLISM, UNSPECIFIED CHRONICITY, UNSPECIFIED PULMONARY EMBOLISM TYPE, UNSPECIFIED WHETHER ACUTE COR PULMONALE PRESENT: ICD-10-CM

## 2025-01-15 DIAGNOSIS — Z12.31 ENCOUNTER FOR SCREENING MAMMOGRAM FOR BREAST CANCER: Primary | ICD-10-CM

## 2025-01-15 LAB
ALBUMIN SERPL-MCNC: 3.8 G/DL (ref 3.5–5)
ALBUMIN/GLOB SERPL: 0.9 RATIO (ref 1.1–2)
ALP SERPL-CCNC: 83 UNIT/L (ref 40–150)
ALT SERPL-CCNC: 11 UNIT/L (ref 0–55)
ANION GAP SERPL CALC-SCNC: 6 MEQ/L
AST SERPL-CCNC: 15 UNIT/L (ref 5–34)
BASOPHILS # BLD AUTO: 0.02 X10(3)/MCL
BASOPHILS NFR BLD AUTO: 0.4 %
BILIRUB SERPL-MCNC: 0.4 MG/DL
BUN SERPL-MCNC: 13.5 MG/DL (ref 9.8–20.1)
CALCIUM SERPL-MCNC: 9.5 MG/DL (ref 8.4–10.2)
CHLORIDE SERPL-SCNC: 103 MMOL/L (ref 98–107)
CO2 SERPL-SCNC: 29 MMOL/L (ref 22–29)
CREAT SERPL-MCNC: 0.79 MG/DL (ref 0.55–1.02)
CREAT/UREA NIT SERPL: 17
EOSINOPHIL # BLD AUTO: 0.32 X10(3)/MCL (ref 0–0.9)
EOSINOPHIL NFR BLD AUTO: 7.1 %
ERYTHROCYTE [DISTWIDTH] IN BLOOD BY AUTOMATED COUNT: 13.2 % (ref 11.5–17)
GFR SERPLBLD CREATININE-BSD FMLA CKD-EPI: >60 ML/MIN/1.73/M2
GLOBULIN SER-MCNC: 4.4 GM/DL (ref 2.4–3.5)
GLUCOSE SERPL-MCNC: 85 MG/DL (ref 74–100)
HCT VFR BLD AUTO: 42.4 % (ref 37–47)
HGB BLD-MCNC: 13.3 G/DL (ref 12–16)
IMM GRANULOCYTES # BLD AUTO: 0 X10(3)/MCL (ref 0–0.04)
IMM GRANULOCYTES NFR BLD AUTO: 0 %
LYMPHOCYTES # BLD AUTO: 2.09 X10(3)/MCL (ref 0.6–4.6)
LYMPHOCYTES NFR BLD AUTO: 46.5 %
MCH RBC QN AUTO: 27.8 PG (ref 27–31)
MCHC RBC AUTO-ENTMCNC: 31.4 G/DL (ref 33–36)
MCV RBC AUTO: 88.7 FL (ref 80–94)
MONOCYTES # BLD AUTO: 0.44 X10(3)/MCL (ref 0.1–1.3)
MONOCYTES NFR BLD AUTO: 9.8 %
NEUTROPHILS # BLD AUTO: 1.62 X10(3)/MCL (ref 2.1–9.2)
NEUTROPHILS NFR BLD AUTO: 36.2 %
NRBC BLD AUTO-RTO: 0 %
PLATELET # BLD AUTO: 239 X10(3)/MCL (ref 130–400)
PMV BLD AUTO: 10.8 FL (ref 7.4–10.4)
POTASSIUM SERPL-SCNC: 4.2 MMOL/L (ref 3.5–5.1)
PROT SERPL-MCNC: 8.2 GM/DL (ref 6.4–8.3)
RBC # BLD AUTO: 4.78 X10(6)/MCL (ref 4.2–5.4)
SODIUM SERPL-SCNC: 138 MMOL/L (ref 136–145)
WBC # BLD AUTO: 4.49 X10(3)/MCL (ref 4.5–11.5)

## 2025-01-15 PROCEDURE — 36415 COLL VENOUS BLD VENIPUNCTURE: CPT

## 2025-01-15 PROCEDURE — 85025 COMPLETE CBC W/AUTO DIFF WBC: CPT

## 2025-01-15 PROCEDURE — 80053 COMPREHEN METABOLIC PANEL: CPT

## 2025-01-16 NOTE — PROGRESS NOTES
Referring provider:  [facility] consultation    Reason for consultation:  Pulmonary embolism      Diagnosis:  Unprovoked PE      HPI:    Patient is a 57-year-old with history of hypertension, hyperlipidemia, prior provoked PE who presented to the ER with symptoms of shortness of breath and chest pain for through to 3 days prior to presentation in April 2023.  She had a CT chest PE protocol done due to elevated D-dimer and was found to have multiple right-sided pulmonary embolism.  Patient was started on Eliquis in the hospital and discharged home thereafter.  Patient presented to clinic on 06/23/2023 to establish care to discuss further treatment recommendations.  She denied any history of prolonged immobilization during travel prior to her diagnosis.  She denied any direct or indirect trauma, falls.    Of note, patient had a prior history of provoked PE after her hernia repair for which she was on Eiquis for 3 years in the past.    She denies smoking, alcohol or recreational drug use.  She denies over-the-counter herbs, supplements, hormone replacement.  Patient denies any family history of DVT/PE.  He does have history of ovarian cancer in her family in her mother, prostate cancer in her father and ovarian cancer in other family member.  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    Interval history:    Today, 1/17/25, patient denies any acute concerns, she reports having been off Eliquis since sept 2024 when she lost insurance. She restarted Jan 1 when she got insurance reinstated. Was advised by an unknown provider to take 325 mg Aspirin daily while off of eliquis. Patient has had no issues since seeing us last and is feeling well today. She denies any significant symptoms of bleeding. PCP and cardiology are aware of her having been off of eliquis and she is following up with both.      Laboratory  1/15/25 CMP WNL, CBC WNL    2/23/24 CMP WNL, CBC  WNL  2023 creatinine 0.75, CMP unremarkable, WBC count 4.6, hemoglobin 11.8, MCV 86.9, platelet count 222, ANC 2.5, antithrombin 3 activity 115%, beta 2 glycoprotein IgG and IgM within normal limits, anticardiolipin IgG/IgM within normal limits, factor 5 Leiden not detected, lupus anticoagulant not detected, protein C functional 130%, protein S functional 111%, prothrombin gene mutation not detected,    Past Medical History:   Diagnosis Date    Acid reflux     Current use of long term anticoagulation 2023    Diabetes mellitus 2024    Hyperlipidemia     Hypertension     Moderate episode of recurrent major depressive disorder 2022    Moderate persistent asthma, uncomplicated 2024    Personal history of pulmonary embolism 2024    Undifferentiated connective tissue disease 2022    Unspecified atrial fibrillation 2024       Past Surgical History:   Procedure Laterality Date     SECTION      HERNIA REPAIR      OOPHORECTOMY Left 2013    OVARY SURGERY      TUBAL LIGATION         Family History   Problem Relation Name Age of Onset    Ovarian cancer Mother      COPD Mother      Diabetes Mellitus Mother      Hypertension Mother      Cancer Mother      Hypertension Father      Diabetes Mellitus Father      Cancer Father      Dementia Father      Ovarian cancer Sister      No Known Problems Daughter      No Known Problems Maternal Aunt      No Known Problems Maternal Uncle      No Known Problems Paternal Aunt      No Known Problems Paternal Uncle      No Known Problems Maternal Grandmother      No Known Problems Maternal Grandfather      No Known Problems Paternal Grandmother      No Known Problems Paternal Grandfather      No Known Problems Other      BRCA 1/2 Neg Hx         Social History     Socioeconomic History    Marital status:    Tobacco Use    Smoking status: Never     Passive exposure: Never    Smokeless tobacco: Never   Substance and Sexual Activity     Alcohol use: Never    Drug use: Never    Sexual activity: Not Currently     Social Drivers of Health     Financial Resource Strain: Low Risk  (7/9/2024)    Overall Financial Resource Strain (CARDIA)     Difficulty of Paying Living Expenses: Not hard at all   Food Insecurity: No Food Insecurity (7/9/2024)    Hunger Vital Sign     Worried About Running Out of Food in the Last Year: Never true     Ran Out of Food in the Last Year: Never true   Physical Activity: Unknown (7/9/2024)    Exercise Vital Sign     Days of Exercise per Week: 2 days   Stress: Stress Concern Present (7/9/2024)    [employer] - Occupational Stress Questionnaire     Feeling of Stress : Very much   Housing Stability: Unknown (7/9/2024)    Housing Stability Vital Sign     Unable to Pay for Housing in the Last Year: No       Current Outpatient Medications   Medication Sig Dispense Refill    ADVAIR DISKUS 250-50 mcg/dose diskus inhaler 2 (two) times daily.      albuterol (PROVENTIL/VENTOLIN HFA) 90 mcg/actuation inhaler 2 puffs 4 (four) times daily as needed.      atorvastatin (LIPITOR) 40 MG tablet Take 1 tablet (40 mg total) by mouth once daily. 90 tablet 3    blood sugar diagnostic Strp 1 each by Misc.(Non-Drug; Combo Route) route 2 (two) times daily. 100 each 1    blood-glucose meter kit 1 each by Other route 2 (two) times daily. Use as instructed 1 each 0    cholecalciferol, vitamin D3, 1,250 mcg (50,000 unit) capsule Take 1 capsule (50,000 Units total) by mouth once a week. 5 capsule 5    ELIQUIS 5 mg Tab Take 5 mg by mouth 2 (two) times daily.      EScitalopram oxalate (LEXAPRO) 10 MG tablet Take 1 tablet (10 mg total) by mouth once daily. Take 1/2 tab daily x 1 week then increase to 1 tab 90 tablet 3    folic acid (FOLVITE) 1 MG tablet Take 1 tablet (1,000 mcg total) by mouth once daily. 30 tablet 5    furosemide (LASIX) 20 MG tablet Take 1 tablet (20 mg total) by mouth once daily. 90 tablet 0    lisinopriL  (PRINIVIL,ZESTRIL) 30 MG tablet Take 1 tablet (30 mg total) by mouth once daily. 90 tablet 3    metFORMIN (GLUCOPHAGE) 500 MG tablet Take 1 tablet (500 mg total) by mouth daily with breakfast. 90 tablet 3    omeprazole (PRILOSEC) 40 MG capsule Take 1 capsule (40 mg total) by mouth every morning. 30 capsule 5    ONETOUCH DELICA PLUS LANCET 30 gauge Misc       tirzepatide (MOUNJARO) 5 mg/0.5 mL PnIj Inject 5 mg into the skin every 7 days. 2 mL 2    ALPRAZolam (XANAX) 0.25 MG tablet Take 1 tablet (0.25 mg total) by mouth daily as needed for Anxiety. 15 tablet 0     No current facility-administered medications for this visit.       Review of patient's allergies indicates:   Allergen Reactions    Iodine     Morphine Other (See Comments)    Opioids - morphine analogues Other (See Comments)     Makes her feel hot and bad    Shrimp Itching    Theophenyllin Palpitations    Theophylline Palpitations         Review of systems  CONSTITUTIONAL: no fevers, no chills, no weight loss, no fatigue, no weakness  HEMATOLOGIC: no abnormal bleeding, no abnormal bruising, no drenching night sweats  ONCOLOGIC: no new masses or lumps  HEENT: no vision loss, no tinnitus or hearing loss, no nose bleeding, no dysphagia, no odynophagia  CVS: no chest pain, no palpitations, no dyspnea on exertion  RESP: no shortness of breath, no hemoptysis, no cough  GI: no nausea, no vomiting, no diarrhea, no constipation, no melena, no hematochezia, no hematemesis, no abdominal pain, no increase in abdominal girth  : no dysuria, no hematuria, no hesitancy, no scrotal swelling, no discharge  INTEGUMENT: no rashes, no abnormal bruising, no nail pitting, no hyperpigmentation  NEURO: no falls, no memory loss, no paresthesias or dysesthesias, no urofecal incontinence or retention, no loss of strength on any extremity  MSK: no back pain, no new joint pain, no joint swelling  PSYCH: no suicidal or homicidal ideation, no depression, no insomnia, no  anhedonia  ENDOCRINE: no heat or cold intolerance, no polyuria, no polydipsia      Physical Exam:  Vitals:    01/17/25 0909   BP: 129/82   Pulse: 83   Resp: 14   Temp: 98.5 °F (36.9 °C)           ECOG PS 0  GA: AAOx3, NAD  HEENT: NCAT, PERRLA, EOMI, good dentition, moist oral mucous membranes  LYMPH: no cervical, axillary or supraclavicular adenopathy  CVS: s1s2 RRR, no M/R/G  RESP: CTA b/l, no crackles, no wheezes or rhonchi  ABD: soft, NT, ND, BS+, no hepatosplenomegaly  EXT: no deformities, no pedal edema  SKIN: no rashes, warm and dry  NEURO: normal mentation, strength 5/5 on all 4 extremities, no sensory deficits        Assessment    # Unprovoked PE  Patient was diagnosed with unprovoked PE in April 2023 after she presented with symptoms of shortness chest pain   She had a prior diagnosis of provoked PE after surgery in 2014 for which she was on anticoagulation up until 2018   Patient denies any family history of DVT/PE   Patient was obese however does not have any other risk factors.  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  Discussed the diagnosis, treatment recommendations with Eliquis, likely indefinitely given the unprovoked nature of her most recent PE.      Plan   1/17/25  Continue Eliquis 5 mg b.i.d.   Follow-up in clinic in 6 months with repeat labs.    Erica ALVARADO

## 2025-01-17 ENCOUNTER — OFFICE VISIT (OUTPATIENT)
Dept: HEMATOLOGY/ONCOLOGY | Facility: CLINIC | Age: 60
End: 2025-01-17
Payer: COMMERCIAL

## 2025-01-17 VITALS
WEIGHT: 226.69 LBS | RESPIRATION RATE: 14 BRPM | DIASTOLIC BLOOD PRESSURE: 82 MMHG | OXYGEN SATURATION: 100 % | HEIGHT: 61 IN | BODY MASS INDEX: 42.8 KG/M2 | SYSTOLIC BLOOD PRESSURE: 129 MMHG | TEMPERATURE: 99 F | HEART RATE: 83 BPM

## 2025-01-17 DIAGNOSIS — Z86.711 HISTORY OF PULMONARY EMBOLUS (PE): Primary | ICD-10-CM

## 2025-01-17 DIAGNOSIS — Z79.01 CURRENT USE OF LONG TERM ANTICOAGULATION: ICD-10-CM

## 2025-01-17 PROCEDURE — 4010F ACE/ARB THERAPY RXD/TAKEN: CPT | Mod: CPTII,,,

## 2025-01-17 PROCEDURE — 3008F BODY MASS INDEX DOCD: CPT | Mod: CPTII,,,

## 2025-01-17 PROCEDURE — 3074F SYST BP LT 130 MM HG: CPT | Mod: CPTII,,,

## 2025-01-17 PROCEDURE — 99214 OFFICE O/P EST MOD 30 MIN: CPT | Mod: ,,,

## 2025-01-17 PROCEDURE — 3079F DIAST BP 80-89 MM HG: CPT | Mod: CPTII,,,

## 2025-01-17 PROCEDURE — 1160F RVW MEDS BY RX/DR IN RCRD: CPT | Mod: CPTII,,,

## 2025-01-17 PROCEDURE — 1159F MED LIST DOCD IN RCRD: CPT | Mod: CPTII,,,

## 2025-01-18 ENCOUNTER — HOSPITAL ENCOUNTER (OUTPATIENT)
Dept: RADIOLOGY | Facility: HOSPITAL | Age: 60
Discharge: HOME | End: 2025-01-18
Attending: NURSE PRACTITIONER
Payer: COMMERCIAL

## 2025-01-18 VITALS — WEIGHT: 226 LBS | BODY MASS INDEX: 42.67 KG/M2 | HEIGHT: 61 IN

## 2025-01-18 DIAGNOSIS — Z12.31 ENCOUNTER FOR SCREENING MAMMOGRAM FOR BREAST CANCER: ICD-10-CM

## 2025-01-18 PROCEDURE — 77067 SCR MAMMO BI INCL CAD: CPT | Mod: TC

## 2025-03-01 ENCOUNTER — TELEPHONE (OUTPATIENT)
Dept: PHARMACY | Facility: CLINIC | Age: 60
End: 2025-03-01
Payer: COMMERCIAL

## 2025-03-01 NOTE — TELEPHONE ENCOUNTER
[facility]/Population [facility] Chart Review & Patient Outreach Details For Medication Adherence Project    Reason for Outreach Encounter: 3rd Party payor non-compliance report ([employer], [employer], [employer], etc)  2.  Patient Outreach Method: Reviewed patient chart   3.   Medication in question:    Diabetes Medications              metFORMIN (GLUCOPHAGE) 500 MG tablet Take 1 tablet (500 mg total) by mouth daily with breakfast.    tirzepatide (MOUNJARO) 5 mg/0.5 mL PnIj Inject 5 mg into the skin every 7 days.                 Metformin  last filled  1/9/25 for 90 day supply      4.  Reviewed and or Updates Made To: Patient Chart  5. Outreach Outcomes and/or actions taken: Patient filled medication and is on track to be adherent  Additional Notes:

## 2025-03-27 DIAGNOSIS — E11.65 TYPE 2 DIABETES MELLITUS WITH HYPERGLYCEMIA, WITHOUT LONG-TERM CURRENT USE OF INSULIN: Primary | ICD-10-CM

## 2025-04-14 ENCOUNTER — TELEPHONE (OUTPATIENT)
Dept: PHARMACY | Facility: CLINIC | Age: 60
End: 2025-04-14
Payer: COMMERCIAL

## 2025-04-14 NOTE — TELEPHONE ENCOUNTER
[facility]Population [facility] Chart Review & Patient Outreach Details For Medication Adherence Project    Reason for Outreach Encounter: 3rd Party payor non-compliance report ([employer], [employer], [employer], etc)  2.  Patient Outreach Method: Reviewed patient chart   3.   Medication in question:    Hyperlipidemia Medications              atorvastatin (LIPITOR) 40 MG tablet Take 1 tablet (40 mg total) by mouth once daily.                  LF 90 ds 3/30/25    4.  Reviewed and or Updates Made To: Patient Chart  5. Outreach Outcomes and/or actions taken: Patient filled medication and is on track to be adherent  Additional Notes:

## 2025-05-14 ENCOUNTER — RESULTS FOLLOW-UP (OUTPATIENT)
Dept: FAMILY MEDICINE | Facility: CLINIC | Age: 60
End: 2025-05-14

## 2025-05-14 ENCOUNTER — LAB VISIT (OUTPATIENT)
Dept: LAB | Facility: HOSPITAL | Age: 60
End: 2025-05-14
Attending: STUDENT IN AN ORGANIZED HEALTH CARE EDUCATION/TRAINING PROGRAM
Payer: COMMERCIAL

## 2025-05-14 DIAGNOSIS — Z00.00 WELLNESS EXAMINATION: ICD-10-CM

## 2025-05-14 DIAGNOSIS — Z00.00 WELL ADULT HEALTH CHECK: ICD-10-CM

## 2025-05-14 LAB
25(OH)D3+25(OH)D2 SERPL-MCNC: 25 NG/ML (ref 30–80)
ALBUMIN SERPL-MCNC: 3.4 G/DL (ref 3.5–5)
ALBUMIN/GLOB SERPL: 0.8 RATIO (ref 1.1–2)
ALP SERPL-CCNC: 79 UNIT/L (ref 40–150)
ALT SERPL-CCNC: 15 UNIT/L (ref 0–55)
ANION GAP SERPL CALC-SCNC: 6 MEQ/L
AST SERPL-CCNC: 16 UNIT/L (ref 11–45)
BASOPHILS # BLD AUTO: 0.03 X10(3)/MCL
BASOPHILS NFR BLD AUTO: 0.7 %
BILIRUB SERPL-MCNC: 0.3 MG/DL
BUN SERPL-MCNC: 11.4 MG/DL (ref 9.8–20.1)
CALCIUM SERPL-MCNC: 9.4 MG/DL (ref 8.4–10.2)
CHLORIDE SERPL-SCNC: 105 MMOL/L (ref 98–107)
CHOLEST SERPL-MCNC: 191 MG/DL
CHOLEST/HDLC SERPL: 4 {RATIO} (ref 0–5)
CO2 SERPL-SCNC: 29 MMOL/L (ref 22–29)
CREAT SERPL-MCNC: 0.69 MG/DL (ref 0.55–1.02)
CREAT UR-MCNC: 127.9 MG/DL (ref 45–106)
CREAT/UREA NIT SERPL: 17
EOSINOPHIL # BLD AUTO: 0.2 X10(3)/MCL (ref 0–0.9)
EOSINOPHIL NFR BLD AUTO: 4.8 %
ERYTHROCYTE [DISTWIDTH] IN BLOOD BY AUTOMATED COUNT: 14.4 % (ref 11.5–17)
EST. AVERAGE GLUCOSE BLD GHB EST-MCNC: 114 MG/DL
FOLATE SERPL-MCNC: 6.4 NG/ML (ref 7–31.4)
GFR SERPLBLD CREATININE-BSD FMLA CKD-EPI: >60 ML/MIN/1.73/M2
GLOBULIN SER-MCNC: 4.5 GM/DL (ref 2.4–3.5)
GLUCOSE SERPL-MCNC: 102 MG/DL (ref 74–100)
HBA1C MFR BLD: 5.6 %
HCT VFR BLD AUTO: 40.5 % (ref 37–47)
HDLC SERPL-MCNC: 52 MG/DL (ref 35–60)
HGB BLD-MCNC: 12.3 G/DL (ref 12–16)
IMM GRANULOCYTES # BLD AUTO: 0.01 X10(3)/MCL (ref 0–0.04)
IMM GRANULOCYTES NFR BLD AUTO: 0.2 %
LDLC SERPL CALC-MCNC: 126 MG/DL (ref 50–140)
LYMPHOCYTES # BLD AUTO: 1.77 X10(3)/MCL (ref 0.6–4.6)
LYMPHOCYTES NFR BLD AUTO: 42.9 %
MCH RBC QN AUTO: 27.6 PG (ref 27–31)
MCHC RBC AUTO-ENTMCNC: 30.4 G/DL (ref 33–36)
MCV RBC AUTO: 90.8 FL (ref 80–94)
MICROALBUMIN UR-MCNC: <5 UG/ML
MICROALBUMIN/CREAT RATIO PNL UR: ABNORMAL
MONOCYTES # BLD AUTO: 0.41 X10(3)/MCL (ref 0.1–1.3)
MONOCYTES NFR BLD AUTO: 9.9 %
NEUTROPHILS # BLD AUTO: 1.71 X10(3)/MCL (ref 2.1–9.2)
NEUTROPHILS NFR BLD AUTO: 41.5 %
NRBC BLD AUTO-RTO: 0 %
PLATELET # BLD AUTO: 240 X10(3)/MCL (ref 130–400)
PMV BLD AUTO: 11.2 FL (ref 7.4–10.4)
POTASSIUM SERPL-SCNC: 4.4 MMOL/L (ref 3.5–5.1)
PROT SERPL-MCNC: 7.9 GM/DL (ref 6.4–8.3)
RBC # BLD AUTO: 4.46 X10(6)/MCL (ref 4.2–5.4)
SODIUM SERPL-SCNC: 140 MMOL/L (ref 136–145)
TRIGL SERPL-MCNC: 67 MG/DL (ref 37–140)
TSH SERPL-ACNC: 2.31 UIU/ML (ref 0.35–4.94)
URATE SERPL-MCNC: 5.6 MG/DL (ref 2.6–6)
VIT B12 SERPL-MCNC: 264 PG/ML (ref 213–816)
VLDLC SERPL CALC-MCNC: 13 MG/DL
WBC # BLD AUTO: 4.13 X10(3)/MCL (ref 4.5–11.5)

## 2025-05-14 PROCEDURE — 82607 VITAMIN B-12: CPT

## 2025-05-14 PROCEDURE — 82043 UR ALBUMIN QUANTITATIVE: CPT

## 2025-05-14 PROCEDURE — 80053 COMPREHEN METABOLIC PANEL: CPT

## 2025-05-14 PROCEDURE — 82746 ASSAY OF FOLIC ACID SERUM: CPT

## 2025-05-14 PROCEDURE — 36415 COLL VENOUS BLD VENIPUNCTURE: CPT

## 2025-05-14 PROCEDURE — 84550 ASSAY OF BLOOD/URIC ACID: CPT

## 2025-05-14 PROCEDURE — 82306 VITAMIN D 25 HYDROXY: CPT

## 2025-05-14 PROCEDURE — 84443 ASSAY THYROID STIM HORMONE: CPT

## 2025-05-14 PROCEDURE — 85025 COMPLETE CBC W/AUTO DIFF WBC: CPT

## 2025-05-14 PROCEDURE — 80061 LIPID PANEL: CPT

## 2025-05-14 PROCEDURE — 83036 HEMOGLOBIN GLYCOSYLATED A1C: CPT

## 2025-05-15 ENCOUNTER — PATIENT MESSAGE (OUTPATIENT)
Dept: FAMILY MEDICINE | Facility: CLINIC | Age: 60
End: 2025-05-15

## 2025-05-22 ENCOUNTER — TELEPHONE (OUTPATIENT)
Dept: FAMILY MEDICINE | Facility: CLINIC | Age: 60
End: 2025-05-22
Payer: COMMERCIAL

## 2025-05-22 ENCOUNTER — PATIENT MESSAGE (OUTPATIENT)
Dept: FAMILY MEDICINE | Facility: CLINIC | Age: 60
End: 2025-05-22
Payer: COMMERCIAL

## 2025-05-28 ENCOUNTER — OFFICE VISIT (OUTPATIENT)
Dept: FAMILY MEDICINE | Facility: CLINIC | Age: 60
End: 2025-05-28
Payer: COMMERCIAL

## 2025-05-28 VITALS
OXYGEN SATURATION: 99 % | RESPIRATION RATE: 18 BRPM | SYSTOLIC BLOOD PRESSURE: 129 MMHG | DIASTOLIC BLOOD PRESSURE: 79 MMHG | TEMPERATURE: 98 F | HEART RATE: 82 BPM | HEIGHT: 61 IN | WEIGHT: 228 LBS | BODY MASS INDEX: 43.05 KG/M2

## 2025-05-28 DIAGNOSIS — E78.5 HYPERLIPIDEMIA, UNSPECIFIED HYPERLIPIDEMIA TYPE: ICD-10-CM

## 2025-05-28 DIAGNOSIS — E53.8 FOLATE DEFICIENCY: ICD-10-CM

## 2025-05-28 DIAGNOSIS — Z00.00 WELL ADULT HEALTH CHECK: ICD-10-CM

## 2025-05-28 DIAGNOSIS — I50.9 CONGESTIVE HEART FAILURE, UNSPECIFIED HF CHRONICITY, UNSPECIFIED HEART FAILURE TYPE: ICD-10-CM

## 2025-05-28 DIAGNOSIS — I48.11 LONGSTANDING PERSISTENT ATRIAL FIBRILLATION: ICD-10-CM

## 2025-05-28 DIAGNOSIS — E55.9 VITAMIN D DEFICIENCY: ICD-10-CM

## 2025-05-28 DIAGNOSIS — Z00.00 WELLNESS EXAMINATION: ICD-10-CM

## 2025-05-28 DIAGNOSIS — D72.819 LEUKOPENIA, UNSPECIFIED TYPE: ICD-10-CM

## 2025-05-28 DIAGNOSIS — E66.01 CLASS 3 SEVERE OBESITY DUE TO EXCESS CALORIES WITH SERIOUS COMORBIDITY AND BODY MASS INDEX (BMI) OF 50.0 TO 59.9 IN ADULT: ICD-10-CM

## 2025-05-28 DIAGNOSIS — I10 PRIMARY HYPERTENSION: ICD-10-CM

## 2025-05-28 DIAGNOSIS — K21.9 GASTROESOPHAGEAL REFLUX DISEASE WITHOUT ESOPHAGITIS: ICD-10-CM

## 2025-05-28 DIAGNOSIS — J45.40 MODERATE PERSISTENT ASTHMA, UNCOMPLICATED: ICD-10-CM

## 2025-05-28 DIAGNOSIS — E66.813 CLASS 3 SEVERE OBESITY DUE TO EXCESS CALORIES WITH SERIOUS COMORBIDITY AND BODY MASS INDEX (BMI) OF 50.0 TO 59.9 IN ADULT: ICD-10-CM

## 2025-05-28 DIAGNOSIS — E11.65 TYPE 2 DIABETES MELLITUS WITH HYPERGLYCEMIA, WITHOUT LONG-TERM CURRENT USE OF INSULIN: ICD-10-CM

## 2025-05-28 RX ORDER — FLUTICASONE PROPIONATE AND SALMETEROL 50; 250 UG/1; UG/1
1 POWDER RESPIRATORY (INHALATION) 2 TIMES DAILY
Qty: 60 EACH | Refills: 2 | Status: SHIPPED | OUTPATIENT
Start: 2025-05-28 | End: 2025-08-26

## 2025-05-28 RX ORDER — VIT B12/INTRINSIC FACT/FOLATE 500-20-800
1 TABLET ORAL DAILY
Qty: 90 EACH | Refills: 3 | Status: SHIPPED | OUTPATIENT
Start: 2025-05-28 | End: 2026-05-28

## 2025-05-28 RX ORDER — ATORVASTATIN CALCIUM 40 MG/1
40 TABLET, FILM COATED ORAL DAILY
Qty: 90 TABLET | Refills: 3 | Status: SHIPPED | OUTPATIENT
Start: 2025-05-28 | End: 2026-05-28

## 2025-05-28 RX ORDER — FUROSEMIDE 20 MG/1
20 TABLET ORAL DAILY
Qty: 90 TABLET | Refills: 3 | Status: SHIPPED | OUTPATIENT
Start: 2025-05-28 | End: 2026-05-28

## 2025-05-28 RX ORDER — TIRZEPATIDE 5 MG/.5ML
5 INJECTION, SOLUTION SUBCUTANEOUS
Qty: 2 ML | Refills: 2 | Status: SHIPPED | OUTPATIENT
Start: 2025-05-28 | End: 2025-08-26

## 2025-05-28 RX ORDER — LISINOPRIL 30 MG/1
30 TABLET ORAL DAILY
Qty: 90 TABLET | Refills: 3 | Status: SHIPPED | OUTPATIENT
Start: 2025-05-28 | End: 2026-05-28

## 2025-05-28 RX ORDER — METFORMIN HYDROCHLORIDE 500 MG/1
500 TABLET ORAL
Qty: 90 TABLET | Refills: 3 | Status: SHIPPED | OUTPATIENT
Start: 2025-05-28

## 2025-05-28 RX ORDER — OMEPRAZOLE 40 MG/1
40 CAPSULE, DELAYED RELEASE ORAL DAILY
Qty: 90 CAPSULE | Refills: 3 | Status: SHIPPED | OUTPATIENT
Start: 2025-05-28 | End: 2026-05-28

## 2025-05-28 RX ORDER — TIRZEPATIDE 5 MG/.5ML
5 INJECTION, SOLUTION SUBCUTANEOUS
COMMUNITY
End: 2025-05-28 | Stop reason: SDUPTHER

## 2025-05-28 RX ORDER — ERGOCALCIFEROL 1.25 MG/1
50000 CAPSULE ORAL
Qty: 12 CAPSULE | Refills: 0 | Status: SHIPPED | OUTPATIENT
Start: 2025-05-28

## 2025-05-28 NOTE — PROGRESS NOTES
Family Medicine    Patient ID: [id number]     Chief Complaint: Annual Exam      HPI:     Virgie Light is a 59 y.o. female here today for an annual wellness visit.    History of Present Illness          Blood work results discussed       Folate deff  Leucopenia  Vit D deff  DM     Diabetes mellitus type 2 With hyperglycemia  On Mounjaro and is able to tolerate it good   Would like to stick to the same dose      Obesity  Body mass index is 43.08 kg/m².    Acid reflux-  On Prilosec  Asx     Congestive heart failure  Chronic PE  Leucopenia  Longstanding atrial fibrillation  CHF-  Well controlled  Sees Hem/Onc- NP Erica Cespedes  Sees Dr. Billingsley at [facility]   On fursemide, lisinopril, Elliquis x BID     Hypertension   Reports asymptomatic but blood pressure elevated at home     Hyperlipidemia  On Atorvastatin  Leg cramps resolved after starting Atorvastatin     Moderate episode of recurrent major depressive disorder  Not on meds  PHQ9- 3     Moderate persistent asthma, uncomplicated  Well controlled  On Albuterol and Advair     Undifferentiated connective tissue disease  Dced Methotrexate, Plaquanil, Duloxetine      Past Medical History:   Diagnosis Date    Acid reflux     Current use of long term anticoagulation 2023    Diabetes mellitus 2024    Hyperlipidemia     Hypertension     Moderate episode of recurrent major depressive disorder 2022    Moderate persistent asthma, uncomplicated 2024    Personal history of pulmonary embolism 2024    Undifferentiated connective tissue disease 2022    Unspecified atrial fibrillation 2024        Past Surgical History:   Procedure Laterality Date     SECTION      HERNIA REPAIR      OOPHORECTOMY Left 2013    OVARY SURGERY      TUBAL LIGATION          Social History     Tobacco Use    Smoking status: Never     Passive exposure: Never    Smokeless tobacco: Never   Substance and Sexual Activity    Alcohol use: Never    Drug use: Never     "Sexual activity: Not Currently        Current Outpatient Medications   Medication Instructions    ADVAIR DISKUS 250-50 mcg/dose diskus inhaler 2 times daily    albuterol (PROVENTIL/VENTOLIN HFA) 90 mcg/actuation inhaler 2 puffs, 4 times daily PRN    ALPRAZolam (XANAX) 0.25 mg, Oral, Daily PRN    atorvastatin (LIPITOR) 40 mg, Oral, Daily    blood sugar diagnostic Strp 1 each, Misc.(Non-Drug; Combo Route), 2 times daily    blood-glucose meter kit 1 each, Other, 2 times daily, Use as instructed    cholecalciferol (vitamin D3) 50,000 Units, Oral, Weekly    ELIQUIS 5 mg, 2 times daily    EScitalopram oxalate (LEXAPRO) 10 mg, Oral, Daily, Take 1/2 tab daily x 1 week then increase to 1 tab    folic acid (FOLVITE) 1,000 mcg, Oral, Daily    furosemide (LASIX) 20 mg, Oral, Daily    lisinopriL (PRINIVIL,ZESTRIL) 30 mg, Oral, Daily    metFORMIN (GLUCOPHAGE) 500 mg, Oral, With breakfast    omeprazole (PRILOSEC) 40 mg, Oral, Every morning    ONETOUCH DELICA PLUS LANCET 30 gauge Misc        Review of patient's allergies indicates:   Allergen Reactions    Iodine     Morphine Other (See Comments)    Opioids - morphine analogues Other (See Comments)     Makes her feel hot and bad    Shrimp Itching    Theophenyllin Palpitations    Theophylline Palpitations        Patient Care Team:  Chelo Mijares MD as PCP - General (Family Medicine)  Kody Saab MD as Consulting Physician (Gastroenterology)     Subjective:     Review of Systems    12 point review of systems conducted, negative except as stated in the history of present illness. See HPI for details.    Objective:     Visit Vitals  /79 (BP Location: Left arm, Patient Position: Sitting)   Pulse 82   Temp 98.4 °F (36.9 °C) (Oral)   Resp 18   Ht 5' 1" (1.549 m)   Wt 103.4 kg (228 lb)   LMP  (LMP Unknown)   SpO2 99%   BMI 43.08 kg/m²       Physical Exam    General: Alert and oriented, No acute distress.  Respiratory: Clear to auscultation bilaterally; No wheezes, rales or " rhonchi,Non-labored respirations, Symmetrical chest wall expansion.  Cardiovascular: Regular rate and rhythm, S1/S2 normal, No murmurs, rubs or gallops.  Gastrointestinal: Soft, Non-tender, Non-distended, Normal bowel sounds, No palpable organomegaly.  Psychiatric: Normal interaction, Coherent speech, Euthymic mood, Appropriate affect   Labs Reviewed:     Chemistry:  Lab Results   Component Value Date     05/14/2025    K 4.4 05/14/2025    BUN 11.4 05/14/2025    CREATININE 0.69 05/14/2025    EGFRNORACEVR >60 05/14/2025    CALCIUM 9.4 05/14/2025    ALKPHOS 79 05/14/2025    ALBUMIN 3.4 (L) 05/14/2025    BILIDIR 0.1 09/09/2021    IBILI 0.20 09/09/2021    AST 16 05/14/2025    ALT 15 05/14/2025    [id number] 25 (L) 05/14/2025    TSH 2.313 05/14/2025    QIUZEO5JKKM 1.19 02/06/2020        Lab Results   Component Value Date    HGBA1C 5.6 05/14/2025        Hematology:  Lab Results   Component Value Date    WBC 4.13 (L) 05/14/2025    HGB 12.3 05/14/2025    HCT 40.5 05/14/2025     05/14/2025       Lipid Panel:  Lab Results   Component Value Date    CHOL 191 05/14/2025    HDL 52 05/14/2025    .00 05/14/2025    TRIG 67 05/14/2025    TOTALCHOLEST 4 05/14/2025        Urine:  Lab Results   Component Value Date    APPEARANCEUA CLEAR 07/12/2019    PROTEINUA Negative 07/12/2019    LEUKOCYTESUR Negative 07/12/2019    RBCUA NONE SEEN 07/12/2019    WBCUA NONE SEEN 07/12/2019    BACTERIA NONE SEEN 07/12/2019    CREATRANDUR 127.9 (H) 05/14/2025        Assessment:       ICD-10-CM ICD-9-CM   1. Wellness examination  Z00.00 V70.0   2. Leukopenia, unspecified type  D72.819 288.50   3. Folate deficiency  E53.8 266.2   4. Hyperlipidemia, unspecified hyperlipidemia type  E78.5 272.4   5. Gastroesophageal reflux disease without esophagitis  K21.9 530.81   6. Vitamin D deficiency  E55.9 268.9   7. Type 2 diabetes mellitus with hyperglycemia, without long-term current use of insulin  E11.65 250.00     790.29   8. Primary  hypertension  I10 401.9   9. Congestive heart failure, unspecified HF chronicity, unspecified heart failure type  I50.9 428.0   10. Longstanding persistent atrial fibrillation  I48.11 427.31   11. Moderate persistent asthma, uncomplicated  J45.40 493.90        Plan:     Vaccinations -   Immunization History   Administered Date(s) Administered    COVID-19, MRNA, LN-S, PF (MODERNA FULL 0.5 ML DOSE) 03/23/2021, 04/23/2021, 05/01/2023    COVID-19, mRNA, LNP-S, PF (Moderna) Ages 12+ 12/22/2023    COVID-19, mRNA, LNP-S, bivalent booster, PF (Moderna Omicron)12 + YEARS 05/01/2023    Hepatitis B, Adult 06/17/2020, 12/01/2020    Influenza 05/01/2023    Influenza (FLUBLOK) - Quadrivalent - Recombinant - PF *Preferred* (egg allergy) 01/12/2020    Influenza - Quadrivalent - MDCK - PF 10/20/2020, 11/02/2022, 10/20/2023    Influenza - Quadrivalent - PF *Preferred* (6 months and older) 10/13/2016, 11/11/2016, 02/06/2018, 11/14/2018    Influenza - Trivalent - Fluarix, Flulaval, Fluzone, Afluria - PF 10/13/2016, 11/11/2016, 02/06/2018, 11/14/2018    Pneumococcal Conjugate - 20 Valent 05/01/2023    Tdap 02/06/2018, 12/22/2023    Zoster Recombinant 05/01/2023, 07/28/2023          Assessment & Plan               1. Wellness examination  Cervical Cancer Screening - [facility]   Breast Cancer Screening - Last Mammogram Normal. Follow up in 1 year  Colon Cancer Screening - Colonoscopy  with Dr. Saab  Osteoporosis Screening -  DEXA after 65  Eye Exam - Recommend annually.  Dental Exam - Recommend biannual exams.      Diet discussed (healthy food choices, reduce portions and overall calorie intake)  Exercise 30-45 minutes 5x per week  Avoid excessive alcohol and tobacco if taking  Immunizations dicussed  Monthly breast exam recommended  Preventative exams discussed.      2. Leukopenia, unspecified type  Asymptomatic   Keep scheduled visit with Hematology/Oncology   Continue to monitor    3. Folate  deficiency  Start folate supplementations as prescribed  -     vit [id number] fact-FA cmb #2 (INTRINSI B12-FOLATE) 500- mcg-mg-mcg Tab; Take 1 tablet by mouth once daily.  Dispense: 90 each; Refill: 3    4. Hyperlipidemia, unspecified hyperlipidemia type  Continue atorvastatin along with Mediterranean diet  -     atorvastatin (LIPITOR) 40 MG tablet; Take 1 tablet (40 mg total) by mouth once daily.  Dispense: 90 tablet; Refill: 3    5. Gastroesophageal reflux disease without esophagitis  PPI as prescribed  Avoid high greasy, minty, chocolate , spicy, acidic, fried foods and alcohol.  Reduce caffeine intake, avoid soda.Recommend to take last meal no later than 6 PM  Avoid tight clothing, chew food thoroughly.    -     omeprazole (PRILOSEC) 40 MG capsule; Take 1 capsule (40 mg total) by mouth once daily.  Dispense: 90 capsule; Refill: 3    6. Vitamin D deficiency  Start vitamin-D supplementations as prescribed   Increase outdoor physical activity      -     ergocalciferol (VITAMIN D2) 50,000 unit Cap; Take 1 capsule (50,000 Units total) by mouth every 7 days.  Dispense: 12 capsule; Refill: 0    7. Type 2 diabetes mellitus with hyperglycemia, without long-term current use of insulin  Goal A1c between 7-7.5  Continue Rx as prescribed  Continue 35 minutes of brisk walking along with diabetic diet  Keep scheduled eye visit and foot exam annually    -     tirzepatide (MOUNJARO) 5 mg/0.5 mL PnIj; Inject 5 mg into the skin every 7 days.  Dispense: 2 mL; Refill: 2  -     metFORMIN (GLUCOPHAGE) 500 MG tablet; Take 1 tablet (500 mg total) by mouth daily with breakfast.  Dispense: 90 tablet; Refill: 3  -     Hemoglobin A1C; Future; Expected date: 11/28/2025    8. Primary hypertension  -     lisinopriL (PRINIVIL,ZESTRIL) 30 MG tablet; Take 1 tablet (30 mg total) by mouth once daily.  Dispense: 90 tablet; Refill: 3  Recommend to continue Rx as prescribed   Keep goal blood pressure less than 130/80  Continue 35 minutes of  brisk walking, 5 days in a week  Continue low sodium Diet (DASH Diet - Less than 2 grams of sodium per day).  Recommend to quit smoking if smoking  Maintain healthy weight with goal BMI <25.    9. Congestive heart failure, unspecified HF chronicity, unspecified heart failure type  -     furosemide (LASIX) 20 MG tablet; Take 1 tablet (20 mg total) by mouth once daily.  Dispense: 90 tablet; Refill: 3  Keep blood pressure less than 130/80, A1c less than 7, goal LDL <70   Keep scheduled follow up with Cardiology   Continue 35 minutes of brisk walking and Mediterranean diet  Continue Rx as prescribed    10. Longstanding persistent atrial fibrillation  Continue Eliquis as prescribed   Keep scheduled visit with Cardiology    11. Moderate persistent asthma, uncomplicated  -     ADVAIR DISKUS 250-50 mcg/dose diskus inhaler; Inhale 1 puff into the lungs 2 (two) times daily.  Dispense: 60 each; Refill: 2  Asthma action plan discussed in detail   Continue inhalers as prescribed   ER precautions provided    12. Class 3 severe obesity due to excess calories with serious comorbidity and body mass index (BMI) of 50.0 to 59.9 in adult  Body mass index is 43.08 kg/m².  Goal BMI <30.  Exercise 5 times a week for 30 minutes per day.  Avoid soda, simple sugars, excessive rice, potatoes or bread. Limit fast foods and fried foods.  Choose complex carbs in moderation (example: green vegetables, beans, oatmeal). Eat plenty of fresh fruits and vegetables with lean meats daily.  Do not skip meals. Eat a balanced portion size.  Avoid fad diets. Consider permanent healthy life style changes.     13. Well adult health check  -     CBC Auto Differential; Future; Expected date: 05/28/2026  -     Comprehensive Metabolic Panel; Future; Expected date: 05/28/2026  -     Lipid Panel; Future; Expected date: 05/28/2026  -     TSH; Future; Expected date: 05/28/2026  -     Hemoglobin A1C; Future; Expected date: 05/28/2026  -     Urinalysis; Future;  Expected date: 05/28/2026  -     Microalbumin/Creatinine Ratio, Urine; Future; Expected date: 05/28/2026  -     Vitamin D; Future; Expected date: 05/28/2026  -     T4, Free; Future; Expected date: 05/28/2026  -     Uric Acid; Future; Expected date: 05/28/2026  -     Vitamin B12; Future; Expected date: 05/28/2026  -     Folate; Future; Expected date: 05/28/2026         Follow up in about 4 weeks (around 6/25/2025) for Nurse visit for DM 6/15/25 . 3 [facility] DM. 1 AW. In addition to their scheduled follow up, the patient has also been instructed to follow up on as needed basis.     This note was generated with the assistance of ambient listening technology. Verbal consent was obtained by the patient and accompanying visitor(s) for the recording of patient appointment to facilitate this note. I attest to having reviewed and edited the generated note for accuracy, though some syntax or spelling errors may persist. Please contact the author of this note for any clarification.    Future Appointments   Date Time Provider Department Center   7/22/2025 10:00 AM Tere Arnett NP [facility]        Chelo Mijares MD

## 2025-06-26 ENCOUNTER — TELEPHONE (OUTPATIENT)
Dept: PHARMACY | Facility: CLINIC | Age: 60
End: 2025-06-26
Payer: COMMERCIAL

## 2025-06-27 NOTE — TELEPHONE ENCOUNTER
[facility] Chart Review & Patient Outreach Details For Medication Adherence Project    Reason for Outreach Encounter: 3rd Party payor non-compliance report ([employer], [employer], C, etc)  2.  Patient Outreach Method: Reviewed Patient Chart  3.   Medication in question: metformin    LAST FILLED: 3/30/25 for 90 day supply  Diabetes Medications              metFORMIN (GLUCOPHAGE) 500 MG tablet Take 1 tablet (500 mg total) by mouth daily with breakfast.    tirzepatide (MOUNJARO) 5 mg/0.5 mL PnIj Inject 5 mg into the skin every 7 days.              4.  Reviewed and or Updates Made To: Patient Chart  5. Outreach Outcomes and/or actions taken: Patient filled medication and is on track to be adherent

## 2025-07-06 ENCOUNTER — TELEPHONE (OUTPATIENT)
Dept: PHARMACY | Facility: CLINIC | Age: 60
End: 2025-07-06
Payer: COMMERCIAL

## 2025-07-06 NOTE — TELEPHONE ENCOUNTER
[facility] Chart Review & Patient Outreach Details For Medication Adherence Project    Reason for Outreach Encounter: 3rd Party payor non-compliance report ([employer], [employer], C, etc)  2.  Patient Outreach Method: Reviewed Patient Chart  3.   Medication in question: Metformin 500mg   LAST FILLED: 7/5/25 for 90 day supply  Diabetes Medications              metFORMIN (GLUCOPHAGE) 500 MG tablet Take 1 tablet (500 mg total) by mouth daily with breakfast.    tirzepatide (MOUNJARO) 5 mg/0.5 mL PnIj Inject 5 mg into the skin every 7 days.              4.  Reviewed and or Updates Made To: Patient Chart  5. Outreach Outcomes and/or actions taken: Patient filled medication and is on track to be adherent

## 2025-07-15 ENCOUNTER — TELEPHONE (OUTPATIENT)
Dept: PHARMACY | Facility: CLINIC | Age: 60
End: 2025-07-15
Payer: COMMERCIAL

## 2025-07-16 NOTE — TELEPHONE ENCOUNTER
[facility] Chart Review & Patient Outreach Details For Medication Adherence Project    Reason for Outreach Encounter: 3rd Party payor non-compliance report ([employer], [employer], C, etc)  2.  Patient Outreach Method: Reviewed Patient Chart  3.   Medication in question: Atorvastatin 40mg   LAST FILLED: 7/11/25 for 90 day supply  Hyperlipidemia Medications              atorvastatin (LIPITOR) 40 MG tablet Take 1 tablet (40 mg total) by mouth once daily.               4.  Reviewed and or Updates Made To: Patient Chart  5. Outreach Outcomes and/or actions taken: Patient filled medication and is on track to be adherent

## 2025-07-22 ENCOUNTER — OFFICE VISIT (OUTPATIENT)
Dept: HEMATOLOGY/ONCOLOGY | Facility: CLINIC | Age: 60
End: 2025-07-22
Payer: COMMERCIAL

## 2025-07-22 VITALS
TEMPERATURE: 98 F | DIASTOLIC BLOOD PRESSURE: 83 MMHG | OXYGEN SATURATION: 98 % | BODY MASS INDEX: 43.86 KG/M2 | WEIGHT: 232.31 LBS | SYSTOLIC BLOOD PRESSURE: 163 MMHG | HEIGHT: 61 IN | RESPIRATION RATE: 14 BRPM | HEART RATE: 92 BPM

## 2025-07-22 DIAGNOSIS — Z79.01 CURRENT USE OF LONG TERM ANTICOAGULATION: ICD-10-CM

## 2025-07-22 DIAGNOSIS — Z86.711 HISTORY OF PULMONARY EMBOLUS (PE): Primary | ICD-10-CM

## 2025-07-22 PROCEDURE — 3008F BODY MASS INDEX DOCD: CPT | Mod: CPTII,,,

## 2025-07-22 PROCEDURE — 3079F DIAST BP 80-89 MM HG: CPT | Mod: CPTII,,,

## 2025-07-22 PROCEDURE — 4010F ACE/ARB THERAPY RXD/TAKEN: CPT | Mod: CPTII,,,

## 2025-07-22 PROCEDURE — 3066F NEPHROPATHY DOC TX: CPT | Mod: CPTII,,,

## 2025-07-22 PROCEDURE — 99214 OFFICE O/P EST MOD 30 MIN: CPT | Mod: ,,,

## 2025-07-22 PROCEDURE — 3044F HG A1C LEVEL LT 7.0%: CPT | Mod: CPTII,,,

## 2025-07-22 PROCEDURE — 3061F NEG MICROALBUMINURIA REV: CPT | Mod: CPTII,,,

## 2025-07-22 PROCEDURE — 3077F SYST BP >= 140 MM HG: CPT | Mod: CPTII,,,

## 2025-07-22 NOTE — PROGRESS NOTES
Referring provider:  [facility] [place] [facility] consultation    Reason for consultation:  Pulmonary embolism      Diagnosis:  Unprovoked PE      HPI:    Patient is a 57-year-old with history of hypertension, hyperlipidemia, prior provoked PE who presented to the ER with symptoms of shortness of breath and chest pain for through to 3 days prior to presentation in April 2023.  She had a CT chest PE protocol done due to elevated D-dimer and was found to have multiple right-sided pulmonary embolism.  Patient was started on Eliquis in the hospital and discharged home thereafter.  Patient presented to clinic on 06/23/2023 to establish care to discuss further treatment recommendations.  She denied any history of prolonged immobilization during travel prior to her diagnosis.  She denied any direct or indirect trauma, falls.    Of note, patient had a prior history of provoked PE after her hernia repair for which she was on Eiquis for 3 years in the past.    She denies smoking, alcohol or recreational drug use.  She denies over-the-counter herbs, supplements, hormone replacement.  Patient denies any family history of DVT/PE.  He does have history of ovarian cancer in her family in her mother, prostate cancer in her father and ovarian cancer in other family member.  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    Interval history:    Today, 07/22/25, patient denies any acute concerns. She reports compliance with Eliquis since last follow-up. Patient has had no issues since seeing us last and is feeling well today. She denies any significant symptoms of bleeding. She has regular follow-up with both PCP and cardiology.      Laboratory  7/21/25 CMP WNL, CBC WNL  1/15/25 CMP WNL, CBC WNL    2/23/24 CMP WNL, CBC WNL  06/23/2023 creatinine 0.75, CMP unremarkable, WBC count 4.6, hemoglobin 11.8, MCV 86.9, platelet count 222, ANC 2.5, antithrombin 3 activity 115%, beta 2 glycoprotein IgG and  IgM within normal limits, anticardiolipin IgG/IgM within normal limits, factor 5 Leiden not detected, lupus anticoagulant not detected, protein C functional 130%, protein S functional 111%, prothrombin gene mutation not detected,    Past Medical History:   Diagnosis Date    Acid reflux     Current use of long term anticoagulation 2023    Diabetes mellitus 2024    Hyperlipidemia     Hypertension     Moderate episode of recurrent major depressive disorder 2022    Moderate persistent asthma, uncomplicated 2024    Personal history of pulmonary embolism 2024    Undifferentiated connective tissue disease 2022    Unspecified atrial fibrillation 2024       Past Surgical History:   Procedure Laterality Date     SECTION      HERNIA REPAIR      OOPHORECTOMY Left 2013    OVARY SURGERY      TUBAL LIGATION         Family History   Problem Relation Name Age of Onset    Ovarian cancer Mother      COPD Mother      Diabetes Mellitus Mother      Hypertension Mother      Cancer Mother      Hypertension Father      Diabetes Mellitus Father      Cancer Father      Dementia Father      Ovarian cancer Sister      No Known Problems Daughter      No Known Problems Maternal Aunt      No Known Problems Maternal Uncle      No Known Problems Paternal Aunt      No Known Problems Paternal Uncle      No Known Problems Maternal Grandmother      No Known Problems Maternal Grandfather      No Known Problems Paternal Grandmother      No Known Problems Paternal Grandfather      No Known Problems Other      BRCA 1/2 Neg Hx      Breast cancer Neg Hx         Social History     Socioeconomic History    Marital status:    Tobacco Use    Smoking status: Never     Passive exposure: Never    Smokeless tobacco: Never   Substance and Sexual Activity    Alcohol use: Never    Drug use: Never    Sexual activity: Not Currently     Social Drivers of Health     Financial Resource Strain: Low Risk  (2024)     Overall Financial Resource Strain (CARDIA)     Difficulty of Paying Living Expenses: Not hard at all   Food Insecurity: No Food Insecurity (7/9/2024)    Hunger Vital Sign     Worried About Running Out of Food in the Last Year: Never true     Ran Out of Food in the Last Year: Never true   Physical Activity: Unknown (7/9/2024)    Exercise Vital Sign     Days of Exercise per Week: 2 days   Stress: Stress Concern Present (7/9/2024)    [employer] - Occupational Stress Questionnaire     Feeling of Stress : Very much   Housing Stability: Unknown (7/9/2024)    Housing Stability Vital Sign     Unable to Pay for Housing in the Last Year: No       Current Outpatient Medications   Medication Sig Dispense Refill    ADVAIR DISKUS 250-50 mcg/dose diskus inhaler Inhale 1 puff into the lungs 2 (two) times daily. 60 each 2    albuterol (PROVENTIL/VENTOLIN HFA) 90 mcg/actuation inhaler 2 puffs 4 (four) times daily as needed.      atorvastatin (LIPITOR) 40 MG tablet Take 1 tablet (40 mg total) by mouth once daily. 90 tablet 3    blood sugar diagnostic Strp 1 each by Misc.(Non-Drug; Combo Route) route 2 (two) times daily. 100 each 1    blood-glucose meter kit 1 each by Other route 2 (two) times daily. Use as instructed 1 each 0    ELIQUIS 5 mg Tab Take 5 mg by mouth 2 (two) times daily.      ergocalciferol (VITAMIN D2) 50,000 unit Cap Take 1 capsule (50,000 Units total) by mouth every 7 days. 12 capsule 0    EScitalopram oxalate (LEXAPRO) 10 MG tablet Take 1 tablet (10 mg total) by mouth once daily. Take 1/2 tab daily x 1 week then increase to 1 tab 90 tablet 3    folic acid (FOLVITE) 1 MG tablet Take 1 tablet (1,000 mcg total) by mouth once daily. 30 tablet 5    furosemide (LASIX) 20 MG tablet Take 1 tablet (20 mg total) by mouth once daily. 90 tablet 3    lisinopriL (PRINIVIL,ZESTRIL) 30 MG tablet Take 1 tablet (30 mg total) by mouth once daily. 90 tablet 3    metFORMIN (GLUCOPHAGE) 500 MG tablet Take 1  tablet (500 mg total) by mouth daily with breakfast. 90 tablet 3    omeprazole (PRILOSEC) 40 MG capsule Take 1 capsule (40 mg total) by mouth once daily. 90 capsule 3    ONETOUCH DELICA PLUS LANCET 30 gauge Misc       tirzepatide (MOUNJARO) 5 mg/0.5 mL PnIj Inject 5 mg into the skin every 7 days. 2 mL 2    vit [id number] fact-FA cmb #2 (INTRINSI B12-FOLATE) 500- mcg-mg-mcg Tab Take 1 tablet by mouth once daily. 90 each 3    ALPRAZolam (XANAX) 0.25 MG tablet Take 1 tablet (0.25 mg total) by mouth daily as needed for Anxiety. 15 tablet 0     No current facility-administered medications for this visit.       Review of patient's allergies indicates:   Allergen Reactions    Iodine     Morphine Other (See Comments)    Opioids - morphine analogues Other (See Comments)     Makes her feel hot and bad    Shrimp Itching    Theophenyllin Palpitations    Theophylline Palpitations         Review of systems  CONSTITUTIONAL: no fevers, no chills, no weight loss, no fatigue, no weakness  HEMATOLOGIC: no abnormal bleeding, no abnormal bruising, no drenching night sweats  ONCOLOGIC: no new masses or lumps  HEENT: no vision loss, no tinnitus or hearing loss, no nose bleeding, no dysphagia, no odynophagia  CVS: no chest pain, no palpitations, no dyspnea on exertion  RESP: no shortness of breath, no hemoptysis, no cough  GI: no nausea, no vomiting, no diarrhea, no constipation, no melena, no hematochezia, no hematemesis, no abdominal pain, no increase in abdominal girth  : no dysuria, no hematuria, no hesitancy, no scrotal swelling, no discharge  INTEGUMENT: no rashes, no abnormal bruising, no nail pitting, no hyperpigmentation  NEURO: no falls, no memory loss, no paresthesias or dysesthesias, no urofecal incontinence or retention, no loss of strength on any extremity  MSK: no back pain, no new joint pain, no joint swelling  PSYCH: no suicidal or homicidal ideation, no depression, no insomnia, no anhedonia  ENDOCRINE: no heat or  cold intolerance, no polyuria, no polydipsia      Physical Exam:  There were no vitals filed for this visit.          ECOG PS 0  GA: AAOx3, NAD  HEENT: NCAT, PERRLA, EOMI, good dentition, moist oral mucous membranes  LYMPH: no cervical, axillary or supraclavicular adenopathy  CVS: s1s2 RRR, no M/R/G  RESP: CTA b/l, no crackles, no wheezes or rhonchi  ABD: soft, NT, ND, BS+, no hepatosplenomegaly  EXT: no deformities, no pedal edema  SKIN: no rashes, warm and dry  NEURO: normal mentation, strength 5/5 on all 4 extremities, no sensory deficits        Assessment    # Unprovoked PE  Patient was diagnosed with unprovoked PE in April 2023 after she presented with symptoms of shortness chest pain   She had a prior diagnosis of provoked PE after surgery in 2014 for which she was on anticoagulation up until 2018   Patient denies any family history of DVT/PE   Patient was obese however does not have any other risk factors.  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  Discussed the diagnosis, treatment recommendations with Eliquis, likely indefinitely given the unprovoked nature of her most recent PE.      Plan     Continue Eliquis 5 mg b.i.d.   Follow-up in clinic in 6 months with repeat labs.

## 2025-08-11 ENCOUNTER — TELEPHONE (OUTPATIENT)
Dept: FAMILY MEDICINE | Facility: CLINIC | Age: 60
End: 2025-08-11
Payer: COMMERCIAL

## 2025-08-26 ENCOUNTER — TELEPHONE (OUTPATIENT)
Dept: FAMILY MEDICINE | Facility: CLINIC | Age: 60
End: 2025-08-26
Payer: COMMERCIAL

## 2025-08-29 ENCOUNTER — CLINICAL SUPPORT (OUTPATIENT)
Dept: FAMILY MEDICINE | Facility: CLINIC | Age: 60
End: 2025-08-29
Payer: COMMERCIAL

## 2025-08-29 ENCOUNTER — OFFICE VISIT (OUTPATIENT)
Dept: FAMILY MEDICINE | Facility: CLINIC | Age: 60
End: 2025-08-29
Payer: COMMERCIAL

## 2025-08-29 VITALS
DIASTOLIC BLOOD PRESSURE: 74 MMHG | SYSTOLIC BLOOD PRESSURE: 136 MMHG | OXYGEN SATURATION: 99 % | WEIGHT: 230 LBS | HEART RATE: 84 BPM | RESPIRATION RATE: 18 BRPM | TEMPERATURE: 98 F | BODY MASS INDEX: 39.27 KG/M2 | HEIGHT: 64 IN

## 2025-08-29 DIAGNOSIS — R05.1 ACUTE COUGH: ICD-10-CM

## 2025-08-29 DIAGNOSIS — Z20.828 EXPOSURE TO THE FLU: ICD-10-CM

## 2025-08-29 DIAGNOSIS — J06.9 UPPER RESPIRATORY TRACT INFECTION, UNSPECIFIED TYPE: Primary | ICD-10-CM

## 2025-08-29 DIAGNOSIS — E11.65 TYPE 2 DIABETES MELLITUS WITH HYPERGLYCEMIA, WITHOUT LONG-TERM CURRENT USE OF INSULIN: Primary | ICD-10-CM

## 2025-08-29 DIAGNOSIS — E66.01 CLASS 2 SEVERE OBESITY DUE TO EXCESS CALORIES WITH SERIOUS COMORBIDITY AND BODY MASS INDEX (BMI) OF 39.0 TO 39.9 IN ADULT: ICD-10-CM

## 2025-08-29 DIAGNOSIS — J06.9 UPPER RESPIRATORY TRACT INFECTION, UNSPECIFIED TYPE: ICD-10-CM

## 2025-08-29 DIAGNOSIS — E66.812 CLASS 2 SEVERE OBESITY DUE TO EXCESS CALORIES WITH SERIOUS COMORBIDITY AND BODY MASS INDEX (BMI) OF 39.0 TO 39.9 IN ADULT: ICD-10-CM

## 2025-08-29 DIAGNOSIS — E11.65 TYPE 2 DIABETES MELLITUS WITH HYPERGLYCEMIA, WITHOUT LONG-TERM CURRENT USE OF INSULIN: ICD-10-CM

## 2025-08-29 DIAGNOSIS — B35.1 ONYCHOMYCOSIS: ICD-10-CM

## 2025-08-29 RX ORDER — OSELTAMIVIR PHOSPHATE 75 MG/1
75 CAPSULE ORAL 2 TIMES DAILY
Qty: 10 CAPSULE | Refills: 0 | Status: SHIPPED | OUTPATIENT
Start: 2025-08-29 | End: 2025-09-03

## 2025-08-29 RX ORDER — GUAIFENESIN 600 MG/1
1200 TABLET, EXTENDED RELEASE ORAL 2 TIMES DAILY
Qty: 40 TABLET | Refills: 0 | Status: SHIPPED | OUTPATIENT
Start: 2025-08-29 | End: 2025-09-08